# Patient Record
Sex: MALE | Race: WHITE | Employment: OTHER | URBAN - METROPOLITAN AREA
[De-identification: names, ages, dates, MRNs, and addresses within clinical notes are randomized per-mention and may not be internally consistent; named-entity substitution may affect disease eponyms.]

---

## 2017-10-13 PROBLEM — R10.32 LEFT INGUINAL PAIN: Chronic | Status: ACTIVE | Noted: 2017-10-13

## 2017-12-28 PROBLEM — R10.32 LEFT INGUINAL PAIN: Chronic | Status: RESOLVED | Noted: 2017-10-13 | Resolved: 2017-12-28

## 2018-04-20 DIAGNOSIS — J45.21 MILD INTERMITTENT ASTHMA WITH ACUTE EXACERBATION: ICD-10-CM

## 2018-05-21 ENCOUNTER — TELEPHONE (OUTPATIENT)
Dept: ADMINISTRATIVE | Age: 53
End: 2018-05-21

## 2018-05-21 NOTE — TELEPHONE ENCOUNTER
neck bothering him, pulled muscle or headache, requested morning appt tomorrow,pcp not available; pt said he will just go to walk in care this evening after work

## 2018-05-22 ENCOUNTER — OFFICE VISIT (OUTPATIENT)
Dept: FAMILY MEDICINE CLINIC | Age: 53
End: 2018-05-22
Payer: COMMERCIAL

## 2018-05-22 VITALS
DIASTOLIC BLOOD PRESSURE: 81 MMHG | BODY MASS INDEX: 29.55 KG/M2 | SYSTOLIC BLOOD PRESSURE: 128 MMHG | RESPIRATION RATE: 16 BRPM | HEIGHT: 68 IN | HEART RATE: 73 BPM | WEIGHT: 195 LBS

## 2018-05-22 DIAGNOSIS — S16.1XXA STRAIN OF NECK MUSCLE, INITIAL ENCOUNTER: Primary | ICD-10-CM

## 2018-05-22 PROCEDURE — 99213 OFFICE O/P EST LOW 20 MIN: CPT | Performed by: PHYSICIAN ASSISTANT

## 2018-07-02 DIAGNOSIS — J45.20 MILD INTERMITTENT ASTHMA WITHOUT COMPLICATION: Chronic | ICD-10-CM

## 2018-07-02 RX ORDER — ALBUTEROL SULFATE 90 UG/1
AEROSOL, METERED RESPIRATORY (INHALATION)
Qty: 3 INHALER | Refills: 1 | Status: SHIPPED | OUTPATIENT
Start: 2018-07-02 | End: 2018-11-05 | Stop reason: SDUPTHER

## 2018-11-05 ENCOUNTER — OFFICE VISIT (OUTPATIENT)
Dept: FAMILY MEDICINE CLINIC | Age: 53
End: 2018-11-05
Payer: COMMERCIAL

## 2018-11-05 ENCOUNTER — TELEPHONE (OUTPATIENT)
Dept: ADMINISTRATIVE | Age: 53
End: 2018-11-05

## 2018-11-05 VITALS
DIASTOLIC BLOOD PRESSURE: 82 MMHG | HEART RATE: 68 BPM | OXYGEN SATURATION: 99 % | WEIGHT: 195 LBS | SYSTOLIC BLOOD PRESSURE: 138 MMHG | HEIGHT: 68 IN | BODY MASS INDEX: 29.55 KG/M2 | RESPIRATION RATE: 18 BRPM

## 2018-11-05 DIAGNOSIS — J45.20 MILD INTERMITTENT ASTHMA WITHOUT COMPLICATION: Chronic | ICD-10-CM

## 2018-11-05 DIAGNOSIS — M79.641 PAIN OF RIGHT HAND: Primary | ICD-10-CM

## 2018-11-05 PROCEDURE — 99213 OFFICE O/P EST LOW 20 MIN: CPT | Performed by: FAMILY MEDICINE

## 2018-11-05 RX ORDER — ALBUTEROL SULFATE 90 UG/1
AEROSOL, METERED RESPIRATORY (INHALATION)
Qty: 3 INHALER | Refills: 1 | Status: SHIPPED | OUTPATIENT
Start: 2018-11-05 | End: 2019-02-12 | Stop reason: ALTCHOICE

## 2018-11-05 ASSESSMENT — PATIENT HEALTH QUESTIONNAIRE - PHQ9
SUM OF ALL RESPONSES TO PHQ QUESTIONS 1-9: 0
2. FEELING DOWN, DEPRESSED OR HOPELESS: 0
SUM OF ALL RESPONSES TO PHQ QUESTIONS 1-9: 0
SUM OF ALL RESPONSES TO PHQ9 QUESTIONS 1 & 2: 0
1. LITTLE INTEREST OR PLEASURE IN DOING THINGS: 0

## 2018-11-05 NOTE — PROGRESS NOTES
Right hand pain for 4 months  Does repetitive hand motion at work  Takes one SoLatina with good results  No trauma or swelling    Asthma stable  Takes advair in fall  Now having increasing cough and dyspnea. Physical examination :  /82   Pulse 68   Resp 18   Ht 5' 8\" (1.727 m)   Wt 195 lb (88.5 kg)   SpO2 99%   BMI 29.65 kg/m²     General appearance : healthy, no distress. Eyes : non-icteric sclerae. No goiter. Neck is supple, no masses. No carotid bruits  Lungs : clear bilaterally, no wheezing or crackles. Heart : regular, normal S1S2, no murmurs. Abdomen : soft, no masses. No hepatic or splenomegaly. Extremities : no edema. Right hand no deformities or tenderness. Normal range of motion of wrist and fingers. No tenderness noted  Peripheral pulses : normal.  Gait : normal.  Mood :euthymic. Affect : congruent. BMI was elevated today, and weight loss plan recommended is : conventional weight loss and daily exercise regimen.     Hand pain secondary to strain and tendinitis  Aleve prn  Tylenol prn  Asthma  Refill albuterol and advair

## 2018-12-26 DIAGNOSIS — J45.20 MILD INTERMITTENT ASTHMA WITHOUT COMPLICATION: Chronic | ICD-10-CM

## 2018-12-26 RX ORDER — ALBUTEROL SULFATE 90 MCG
HFA AEROSOL WITH ADAPTER (GRAM) INHALATION
Qty: 20.1 G | Refills: 1 | Status: SHIPPED | OUTPATIENT
Start: 2018-12-26 | End: 2020-01-22

## 2019-02-12 ENCOUNTER — OFFICE VISIT (OUTPATIENT)
Dept: FAMILY MEDICINE CLINIC | Age: 54
End: 2019-02-12
Payer: COMMERCIAL

## 2019-02-12 VITALS
BODY MASS INDEX: 29.86 KG/M2 | HEART RATE: 82 BPM | RESPIRATION RATE: 18 BRPM | WEIGHT: 197 LBS | HEIGHT: 68 IN | SYSTOLIC BLOOD PRESSURE: 140 MMHG | DIASTOLIC BLOOD PRESSURE: 84 MMHG

## 2019-02-12 DIAGNOSIS — F41.1 GENERALIZED ANXIETY DISORDER: ICD-10-CM

## 2019-02-12 DIAGNOSIS — Z13.220 SCREENING FOR HYPERLIPIDEMIA: ICD-10-CM

## 2019-02-12 DIAGNOSIS — R00.2 PALPITATIONS: Primary | ICD-10-CM

## 2019-02-12 DIAGNOSIS — R03.0 ELEVATED BLOOD PRESSURE READING: ICD-10-CM

## 2019-02-12 PROCEDURE — 99213 OFFICE O/P EST LOW 20 MIN: CPT | Performed by: FAMILY MEDICINE

## 2019-02-12 RX ORDER — OLOPATADINE HYDROCHLORIDE 1 MG/ML
SOLUTION/ DROPS OPHTHALMIC
COMMUNITY
Start: 2019-01-31

## 2019-02-12 RX ORDER — FLUOROMETHOLONE 0.1 %
SUSPENSION, DROPS(FINAL DOSAGE FORM)(ML) OPHTHALMIC (EYE)
COMMUNITY
Start: 2019-01-31

## 2019-02-12 RX ORDER — METOPROLOL SUCCINATE 25 MG/1
25 TABLET, EXTENDED RELEASE ORAL DAILY
Qty: 30 TABLET | Refills: 0 | Status: SHIPPED | OUTPATIENT
Start: 2019-02-12 | End: 2019-03-29 | Stop reason: ALTCHOICE

## 2019-02-12 ASSESSMENT — PATIENT HEALTH QUESTIONNAIRE - PHQ9
SUM OF ALL RESPONSES TO PHQ QUESTIONS 1-9: 2
1. LITTLE INTEREST OR PLEASURE IN DOING THINGS: 1
SUM OF ALL RESPONSES TO PHQ9 QUESTIONS 1 & 2: 2
2. FEELING DOWN, DEPRESSED OR HOPELESS: 1
SUM OF ALL RESPONSES TO PHQ QUESTIONS 1-9: 2

## 2019-03-25 ENCOUNTER — HOSPITAL ENCOUNTER (OUTPATIENT)
Age: 54
Discharge: HOME | End: 2019-03-25
Payer: COMMERCIAL

## 2019-03-25 DIAGNOSIS — R03.0 ELEVATED BLOOD PRESSURE READING: ICD-10-CM

## 2019-03-25 DIAGNOSIS — Z13.220 SCREENING FOR HYPERLIPIDEMIA: ICD-10-CM

## 2019-03-25 LAB
ALBUMIN SERPL-MCNC: 4.4 G/DL (ref 3.5–5.2)
ALP BLD-CCNC: 55 U/L (ref 40–129)
ALT SERPL-CCNC: 17 U/L (ref 0–40)
ANION GAP SERPL CALCULATED.3IONS-SCNC: 10 MMOL/L (ref 7–16)
AST SERPL-CCNC: 22 U/L (ref 0–39)
BILIRUB SERPL-MCNC: 0.8 MG/DL (ref 0–1.2)
BUN BLDV-MCNC: 15 MG/DL (ref 6–20)
CALCIUM SERPL-MCNC: 9.3 MG/DL (ref 8.6–10.2)
CHLORIDE BLD-SCNC: 105 MMOL/L (ref 98–107)
CHOLESTEROL, TOTAL: 179 MG/DL (ref 0–199)
CO2: 27 MMOL/L (ref 22–29)
CREAT SERPL-MCNC: 1 MG/DL (ref 0.7–1.2)
GFR AFRICAN AMERICAN: >60
GFR NON-AFRICAN AMERICAN: >60 ML/MIN/1.73
GLUCOSE BLD-MCNC: 103 MG/DL (ref 74–99)
HDLC SERPL-MCNC: 65 MG/DL
LDL CHOLESTEROL CALCULATED: 101 MG/DL (ref 0–99)
POTASSIUM SERPL-SCNC: 4.1 MMOL/L (ref 3.5–5)
SODIUM BLD-SCNC: 142 MMOL/L (ref 132–146)
TOTAL PROTEIN: 7.6 G/DL (ref 6.4–8.3)
TRIGL SERPL-MCNC: 67 MG/DL (ref 0–149)
VLDLC SERPL CALC-MCNC: 13 MG/DL

## 2019-03-25 PROCEDURE — 80053 COMPREHEN METABOLIC PANEL: CPT

## 2019-03-25 PROCEDURE — 36415 COLL VENOUS BLD VENIPUNCTURE: CPT

## 2019-03-25 PROCEDURE — 80061 LIPID PANEL: CPT

## 2019-03-29 ENCOUNTER — OFFICE VISIT (OUTPATIENT)
Dept: FAMILY MEDICINE CLINIC | Age: 54
End: 2019-03-29
Payer: COMMERCIAL

## 2019-03-29 VITALS
HEART RATE: 91 BPM | BODY MASS INDEX: 30.31 KG/M2 | RESPIRATION RATE: 16 BRPM | SYSTOLIC BLOOD PRESSURE: 135 MMHG | WEIGHT: 200 LBS | HEIGHT: 68 IN | DIASTOLIC BLOOD PRESSURE: 79 MMHG

## 2019-03-29 DIAGNOSIS — F41.9 ANXIETY: Primary | ICD-10-CM

## 2019-03-29 PROCEDURE — 99213 OFFICE O/P EST LOW 20 MIN: CPT | Performed by: FAMILY MEDICINE

## 2019-03-29 ASSESSMENT — PATIENT HEALTH QUESTIONNAIRE - PHQ9
1. LITTLE INTEREST OR PLEASURE IN DOING THINGS: 0
2. FEELING DOWN, DEPRESSED OR HOPELESS: 0
SUM OF ALL RESPONSES TO PHQ QUESTIONS 1-9: 0
SUM OF ALL RESPONSES TO PHQ QUESTIONS 1-9: 0
SUM OF ALL RESPONSES TO PHQ9 QUESTIONS 1 & 2: 0

## 2019-12-30 ENCOUNTER — OFFICE VISIT (OUTPATIENT)
Dept: FAMILY MEDICINE CLINIC | Age: 54
End: 2019-12-30
Payer: COMMERCIAL

## 2019-12-30 ENCOUNTER — TELEPHONE (OUTPATIENT)
Dept: FAMILY MEDICINE CLINIC | Age: 54
End: 2019-12-30

## 2019-12-30 VITALS
WEIGHT: 205 LBS | RESPIRATION RATE: 18 BRPM | OXYGEN SATURATION: 97 % | BODY MASS INDEX: 31.17 KG/M2 | SYSTOLIC BLOOD PRESSURE: 120 MMHG | TEMPERATURE: 97.8 F | HEART RATE: 93 BPM | DIASTOLIC BLOOD PRESSURE: 74 MMHG

## 2019-12-30 DIAGNOSIS — I10 ESSENTIAL HYPERTENSION: Primary | ICD-10-CM

## 2019-12-30 DIAGNOSIS — R00.2 PALPITATIONS: ICD-10-CM

## 2019-12-30 PROCEDURE — 99213 OFFICE O/P EST LOW 20 MIN: CPT | Performed by: PHYSICIAN ASSISTANT

## 2019-12-30 RX ORDER — METOPROLOL SUCCINATE 25 MG/1
25 TABLET, EXTENDED RELEASE ORAL DAILY
Qty: 30 TABLET | Refills: 0 | Status: SHIPPED | OUTPATIENT
Start: 2019-12-30 | End: 2020-01-03 | Stop reason: SDUPTHER

## 2019-12-30 ASSESSMENT — ENCOUNTER SYMPTOMS
RESPIRATORY NEGATIVE: 1
EYES NEGATIVE: 1
GASTROINTESTINAL NEGATIVE: 1

## 2020-01-03 ENCOUNTER — OFFICE VISIT (OUTPATIENT)
Dept: FAMILY MEDICINE CLINIC | Age: 55
End: 2020-01-03
Payer: COMMERCIAL

## 2020-01-03 VITALS
HEIGHT: 68 IN | SYSTOLIC BLOOD PRESSURE: 134 MMHG | BODY MASS INDEX: 31.22 KG/M2 | WEIGHT: 206 LBS | RESPIRATION RATE: 16 BRPM | HEART RATE: 70 BPM | DIASTOLIC BLOOD PRESSURE: 78 MMHG

## 2020-01-03 PROCEDURE — 99213 OFFICE O/P EST LOW 20 MIN: CPT | Performed by: FAMILY MEDICINE

## 2020-01-03 RX ORDER — METOPROLOL SUCCINATE 25 MG/1
25 TABLET, EXTENDED RELEASE ORAL DAILY
Qty: 90 TABLET | Refills: 1 | Status: SHIPPED
Start: 2020-01-03 | End: 2020-03-14 | Stop reason: SDUPTHER

## 2020-01-03 ASSESSMENT — PATIENT HEALTH QUESTIONNAIRE - PHQ9
2. FEELING DOWN, DEPRESSED OR HOPELESS: 0
1. LITTLE INTEREST OR PLEASURE IN DOING THINGS: 0
SUM OF ALL RESPONSES TO PHQ QUESTIONS 1-9: 0
SUM OF ALL RESPONSES TO PHQ QUESTIONS 1-9: 0
SUM OF ALL RESPONSES TO PHQ9 QUESTIONS 1 & 2: 0

## 2020-01-03 NOTE — PROGRESS NOTES
Under increased stress at work  Not checking BP  Started on metoprolol early this week  Feels better on it. No exertional chest pain or dyspnea. No ankle edema. No orthopnea. No depression      Physical examination :  /78   Pulse 70   Resp 16   Ht 5' 8\" (1.727 m)   Wt 206 lb (93.4 kg)   BMI 31.32 kg/m²     General appearance : healthy, no distress. Eyes : non-icteric sclerae. No goiter. Neck is supple, no masses. No carotid bruits  Lungs : clear bilaterally, no wheezing or crackles. Heart : regular, normal S1S2, no murmurs. Abdomen : soft, no masses. No hepatic or splenomegaly. Extremities : no edema. Peripheral pulses : normal.  Gait : normal.  Mood :euthymic. Affect : congruent. BMI was elevated today, and weight loss plan recommended is : conventional weight loss and daily exercise regimen. A/P  Anxiety   Palpitations   Improved on metoprolol. Continue same  Advised diet and weight loss. Advised regular exercise. Monitor blood pressure periodically.

## 2020-02-24 RX ORDER — FLUTICASONE PROPIONATE 50 MCG
SPRAY, SUSPENSION (ML) NASAL
Qty: 3 BOTTLE | Refills: 0 | Status: SHIPPED
Start: 2020-02-24 | End: 2020-03-13 | Stop reason: SDUPTHER

## 2020-03-14 RX ORDER — FLUTICASONE PROPIONATE 50 MCG
SPRAY, SUSPENSION (ML) NASAL
Qty: 3 BOTTLE | Refills: 2 | Status: SHIPPED
Start: 2020-03-14 | End: 2020-07-06 | Stop reason: SDUPTHER

## 2020-03-14 RX ORDER — METOPROLOL SUCCINATE 25 MG/1
25 TABLET, EXTENDED RELEASE ORAL DAILY
Qty: 90 TABLET | Refills: 1 | Status: SHIPPED
Start: 2020-03-14 | End: 2020-07-06 | Stop reason: SDUPTHER

## 2020-03-14 RX ORDER — ALBUTEROL SULFATE 90 UG/1
AEROSOL, METERED RESPIRATORY (INHALATION)
Qty: 20.1 G | Refills: 2 | Status: SHIPPED
Start: 2020-03-14 | End: 2020-03-16 | Stop reason: SDUPTHER

## 2020-03-16 NOTE — TELEPHONE ENCOUNTER
Advair no longer covered under patient's insurance. They will cover [facility] or Symbicort. Can we switch to one of these? If so please send to [facility]. 90 day supply. Also his Albuterol inhaler was only sent for 2 inhalers. He would like 3 if possible. Pended for sig.

## 2020-03-17 RX ORDER — ALBUTEROL SULFATE 90 UG/1
AEROSOL, METERED RESPIRATORY (INHALATION)
Qty: 3 INHALER | Refills: 1 | Status: SHIPPED | OUTPATIENT
Start: 2020-03-17 | End: 2020-03-20

## 2020-03-17 RX ORDER — BUDESONIDE AND FORMOTEROL FUMARATE DIHYDRATE 80; 4.5 UG/1; UG/1
2 AEROSOL RESPIRATORY (INHALATION) 2 TIMES DAILY
Qty: 6 INHALER | Refills: 2 | Status: SHIPPED
Start: 2020-03-17 | End: 2020-07-06

## 2020-03-19 NOTE — TELEPHONE ENCOUNTER
The albuterol inhaler not covered per insurance. They will however cover the brand Proair. Pended the Proair for sig if agreeable.

## 2020-04-09 ENCOUNTER — TELEPHONE (OUTPATIENT)
Dept: FAMILY MEDICINE CLINIC | Age: 55
End: 2020-04-09

## 2020-04-09 RX ORDER — PREDNISONE 20 MG/1
40 TABLET ORAL DAILY
Qty: 14 TABLET | Refills: 0 | Status: SHIPPED | OUTPATIENT
Start: 2020-04-09 | End: 2020-04-16

## 2020-04-09 NOTE — TELEPHONE ENCOUNTER
Patient called c/o poison ivy again. He said he got a script last time. Are you able to send something or do you want a Video Visit set up? He did not want to come in the office.

## 2020-04-23 RX ORDER — ALBUTEROL SULFATE 90 UG/1
AEROSOL, METERED RESPIRATORY (INHALATION)
Qty: 3 INHALER | Refills: 1 | Status: SHIPPED
Start: 2020-04-23 | End: 2020-07-06 | Stop reason: SDUPTHER

## 2020-06-23 ENCOUNTER — TELEPHONE (OUTPATIENT)
Dept: ADMINISTRATIVE | Age: 55
End: 2020-06-23

## 2020-06-23 NOTE — TELEPHONE ENCOUNTER
Iliana Finders on Sunday, scratched arm, all red - thinks his tetanus shot is up to date; will go to [facility]

## 2020-07-06 ENCOUNTER — OFFICE VISIT (OUTPATIENT)
Dept: FAMILY MEDICINE CLINIC | Age: 55
End: 2020-07-06
Payer: COMMERCIAL

## 2020-07-06 ENCOUNTER — HOSPITAL ENCOUNTER (OUTPATIENT)
Age: 55
Discharge: HOME | End: 2020-07-08
Payer: COMMERCIAL

## 2020-07-06 VITALS
HEIGHT: 68 IN | BODY MASS INDEX: 31.83 KG/M2 | OXYGEN SATURATION: 98 % | WEIGHT: 210 LBS | RESPIRATION RATE: 18 BRPM | SYSTOLIC BLOOD PRESSURE: 125 MMHG | HEART RATE: 74 BPM | TEMPERATURE: 97.3 F | DIASTOLIC BLOOD PRESSURE: 73 MMHG

## 2020-07-06 PROCEDURE — 99214 OFFICE O/P EST MOD 30 MIN: CPT | Performed by: FAMILY MEDICINE

## 2020-07-06 PROCEDURE — 80061 LIPID PANEL: CPT

## 2020-07-06 PROCEDURE — 83036 HEMOGLOBIN GLYCOSYLATED A1C: CPT

## 2020-07-06 RX ORDER — AZELASTINE 1 MG/ML
2 SPRAY, METERED NASAL 2 TIMES DAILY
Qty: 1 BOTTLE | Refills: 3 | Status: SHIPPED
Start: 2020-07-06 | End: 2021-01-04 | Stop reason: SDUPTHER

## 2020-07-06 RX ORDER — ALBUTEROL SULFATE 90 UG/1
AEROSOL, METERED RESPIRATORY (INHALATION)
Qty: 3 INHALER | Refills: 1 | Status: SHIPPED
Start: 2020-07-06 | End: 2020-07-06 | Stop reason: SDUPTHER

## 2020-07-06 RX ORDER — FLUTICASONE PROPIONATE 50 MCG
SPRAY, SUSPENSION (ML) NASAL
Qty: 3 BOTTLE | Refills: 2 | Status: SHIPPED
Start: 2020-07-06 | End: 2020-07-06 | Stop reason: SDUPTHER

## 2020-07-06 RX ORDER — BUDESONIDE AND FORMOTEROL FUMARATE DIHYDRATE 160; 4.5 UG/1; UG/1
2 AEROSOL RESPIRATORY (INHALATION) 2 TIMES DAILY
Qty: 3 INHALER | Refills: 3 | Status: SHIPPED
Start: 2020-07-06 | End: 2021-01-04 | Stop reason: SDUPTHER

## 2020-07-06 RX ORDER — BUDESONIDE AND FORMOTEROL FUMARATE DIHYDRATE 160; 4.5 UG/1; UG/1
2 AEROSOL RESPIRATORY (INHALATION) 2 TIMES DAILY
Qty: 3 INHALER | Refills: 3 | Status: SHIPPED
Start: 2020-07-06 | End: 2020-07-06 | Stop reason: SDUPTHER

## 2020-07-06 RX ORDER — METOPROLOL SUCCINATE 25 MG/1
25 TABLET, EXTENDED RELEASE ORAL DAILY
Qty: 90 TABLET | Refills: 3 | Status: SHIPPED
Start: 2020-07-06 | End: 2020-07-06 | Stop reason: SDUPTHER

## 2020-07-06 RX ORDER — METOPROLOL SUCCINATE 25 MG/1
25 TABLET, EXTENDED RELEASE ORAL DAILY
Qty: 90 TABLET | Refills: 3 | Status: SHIPPED
Start: 2020-07-06 | End: 2021-01-04

## 2020-07-06 RX ORDER — FLUTICASONE PROPIONATE 50 MCG
SPRAY, SUSPENSION (ML) NASAL
Qty: 3 BOTTLE | Refills: 2 | Status: SHIPPED
Start: 2020-07-06 | End: 2021-01-04 | Stop reason: SDUPTHER

## 2020-07-06 RX ORDER — ALBUTEROL SULFATE 90 UG/1
AEROSOL, METERED RESPIRATORY (INHALATION)
Qty: 3 INHALER | Refills: 1 | Status: SHIPPED
Start: 2020-07-06 | End: 2021-01-04 | Stop reason: SDUPTHER

## 2020-07-06 SDOH — HEALTH STABILITY: MENTAL HEALTH: HOW OFTEN DO YOU HAVE A DRINK CONTAINING ALCOHOL?: 2-4 TIMES A MONTH

## 2020-07-06 ASSESSMENT — ENCOUNTER SYMPTOMS
CHEST TIGHTNESS: 1
ABDOMINAL PAIN: 0
WHEEZING: 1
SHORTNESS OF BREATH: 0

## 2020-07-06 NOTE — PATIENT INSTRUCTIONS
Nye reading about how to prevent transmission. Subject to change as we learn more. Keeping the Coronavirus from [employer] Workers  What [place]s and [employer] success is teaching us about the pandemic. By Rufino Huerta   March 21, 2020  "[employer]. com/news/news-desk/keeping-the-coronavirus-from-infecting-health-care-workers         Azelastine = astelin - use this for control of nasal allergies - works TODAY!    Fill this at [facility]

## 2020-07-06 NOTE — PROGRESS NOTES
[facility]  Family Medicine Residency Program  Phone: [phone]  Fax: [phone]    Patient:  Danelle Collins 49 y.o. male                                 Date of Service: 7/6/20                            Chiefcomplaint:   Chief Complaint   Patient presents with   Yared Stanton Doctor     harshad jang pt     Shortness of Breath         History of Present Illness: The patient is a 49 y.o. male  presented to the clinic with complaints as above. Asthma - has been controller inhaler regularly. Has noted increased wheezing over the past year and it has been less responsive to albuterol. Still clears wheezing with albuterol but not as quickly as in the past.  No increased allergic exposures - potentially brush hog exposure to airborne allergens. Not been hospitalized for this. Last oral pred was for poison ivy but not for breathing. Allergic Rhinitis - bothersome including eye sx and nasal drainage. Has been intermittently using Flonase. Does help to clear when he uses it. Med is somewhat bothersome. Willing to try azelastine PRN. Anxiety - occurred when GM was going through transitions. Started on metoprolol for palpitations. Interested in reducing this when able. Overall sx stable for now. Interested in trying 12.5mg daily to see if this is still effective. Review of Systems:   Review of Systems   Respiratory: Positive for chest tightness and wheezing. Negative for shortness of breath. Cardiovascular: Negative for chest pain and palpitations. Gastrointestinal: Negative for abdominal pain.        Past Medical History:      Diagnosis Date    Anxiety     Asthma     intermittent    Left inguinal pain 10/13/2017    Lymphadenopathy, abdominal 6/10/2015    Incidental on CT 6/2015    Rotator cuff syndrome of left shoulder 9/8/2013    Seasonal allergies        Past Surgical History:        Procedure Laterality Date    COLONOSCOPY  07/01/2017    normal distress  Chest wall/Lung: Clear to auscultation bilaterally,  respirations unlabored. No ronchi/wheezing/rales  Heart[de-identified]  Regular rate and rhythm, S1and S2 normal, no murmur, rub or gallop. Abdomen: Soft, mild epigastrictenderness, bowel sounds normoactive, no masses, no organomegaly  Extremities:  Extremities normal, atraumatic, no cyanosis. +0 edema. Neurologic:   Alert&Oriented. Normal gait and coordination  No focal neurological deficits appreaciated         Psychiatric: has a normal mood and affect. Behavior is normal.       Assessment and Plan:         1. Other allergic rhinitis  Will add azelastine and will allow flonase seasonally  - fluticasone (FLONASE) 50 MCG/ACT nasal spray; USE 2 SPRAYS NASALLY DAILY  Dispense: 3 Bottle; Refill: 2    2. Palpitations  Ok to 1/2 this med  - metoprolol succinate (TOPROL XL) 25 MG extended release tablet; Take 1 tablet by mouth daily  Dispense: 90 tablet; Refill: 3    3. Mild intermittent asthma without complication  Increase symbicort  - budesonide-formoterol (SYMBICORT) 160-4.5 MCG/ACT AERO; Inhale 2 puffs into the lungs 2 times daily  Dispense: 3 Inhaler; Refill: 3  - albuterol sulfate HFA (PROAIR HFA) 108 (90 Base) MCG/ACT inhaler; Generic pro air. 1-2 puffs every 6 hours as needed  Dispense: 3 Inhaler; Refill: 1    4. Screening for hyperlipidemia  - LIPID PANEL; Future    5. Impaired fasting glucose  - HEMOGLOBIN A1C; Future        Return to Office: Return in about 6 months (around 1/6/2021) for asthma. I encourage further reading and education about your health conditions. Information on many healthconditions is provided by the American Academy of Family Physicians: https://familydoctor. org/  Please bring any questions to me at your next visit. This document may have been prepared at least partiallythrough the use of voice recognition software.  Although effort is taken to assure the accuracy of this document, it is possible that grammatical, syntax,  or spelling errors may occur. Medication List:    Current Outpatient Medications   Medication Sig Dispense Refill    azelastine (ASTELIN) 0.1 % nasal spray 2 sprays by Nasal route 2 times daily Use in each nostril as directed 1 Bottle 3    albuterol sulfate HFA (PROAIR HFA) 108 (90 Base) MCG/ACT inhaler Generic pro air. 1-2 puffs every 6 hours as needed 3 Inhaler 1    budesonide-formoterol (SYMBICORT) 160-4.5 MCG/ACT AERO Inhale 2 puffs into the lungs 2 times daily 3 Inhaler 3    metoprolol succinate (TOPROL XL) 25 MG extended release tablet Take 1 tablet by mouth daily 90 tablet 3    fluticasone (FLONASE) 50 MCG/ACT nasal spray USE 2 SPRAYS NASALLY DAILY 3 Bottle 2    fluorometholone (FML) 0.1 % ophthalmic suspension       olopatadine (PATANOL) 0.1 % ophthalmic solution        No current facility-administered medications for this visit.          Devon Solano MD

## 2020-07-07 LAB
CHOLESTEROL, TOTAL: 165 MG/DL (ref 0–199)
HBA1C MFR BLD: 5.6 % (ref 4–5.6)
HDLC SERPL-MCNC: 55 MG/DL
LDL CHOLESTEROL CALCULATED: 90 MG/DL (ref 0–99)
TRIGL SERPL-MCNC: 101 MG/DL (ref 0–149)
VLDLC SERPL CALC-MCNC: 20 MG/DL

## 2020-09-28 ENCOUNTER — TELEPHONE (OUTPATIENT)
Dept: FAMILY MEDICINE CLINIC | Age: 55
End: 2020-09-28

## 2020-09-28 RX ORDER — PREDNISONE 20 MG/1
20 TABLET ORAL DAILY
Qty: 10 TABLET | Refills: 0 | Status: SHIPPED | OUTPATIENT
Start: 2020-09-28 | End: 2020-10-08

## 2020-09-28 NOTE — TELEPHONE ENCOUNTER
Last Appointment   7/6/2020  Next Appointment  1/4/2021    Patient has poison ivy, states that [address] [place] [address] would just call in Prednisone for him. States he gets this every year  I offered an appointment today, he is unable to come in. Wants to know if  can call in medication.

## 2021-01-04 ENCOUNTER — OFFICE VISIT (OUTPATIENT)
Dept: FAMILY MEDICINE CLINIC | Age: 56
End: 2021-01-04
Payer: COMMERCIAL

## 2021-01-04 VITALS
DIASTOLIC BLOOD PRESSURE: 77 MMHG | OXYGEN SATURATION: 98 % | HEART RATE: 76 BPM | HEIGHT: 68 IN | WEIGHT: 217 LBS | RESPIRATION RATE: 18 BRPM | TEMPERATURE: 97.8 F | SYSTOLIC BLOOD PRESSURE: 146 MMHG | BODY MASS INDEX: 32.89 KG/M2

## 2021-01-04 DIAGNOSIS — R73.01 IMPAIRED FASTING GLUCOSE: Primary | ICD-10-CM

## 2021-01-04 DIAGNOSIS — Z12.5 SCREENING FOR PROSTATE CANCER: ICD-10-CM

## 2021-01-04 DIAGNOSIS — R00.2 PALPITATIONS: ICD-10-CM

## 2021-01-04 DIAGNOSIS — J30.89 OTHER ALLERGIC RHINITIS: ICD-10-CM

## 2021-01-04 DIAGNOSIS — Z13.220 SCREENING FOR HYPERLIPIDEMIA: ICD-10-CM

## 2021-01-04 DIAGNOSIS — J45.20 MILD INTERMITTENT ASTHMA WITHOUT COMPLICATION: ICD-10-CM

## 2021-01-04 PROCEDURE — 99214 OFFICE O/P EST MOD 30 MIN: CPT | Performed by: FAMILY MEDICINE

## 2021-01-04 RX ORDER — ALBUTEROL SULFATE 90 UG/1
AEROSOL, METERED RESPIRATORY (INHALATION)
Qty: 3 INHALER | Refills: 1 | Status: SHIPPED
Start: 2021-01-04 | End: 2021-04-27

## 2021-01-04 RX ORDER — PREDNISONE 20 MG/1
20 TABLET ORAL DAILY
Qty: 10 TABLET | Refills: 0 | Status: SHIPPED
Start: 2021-01-04 | End: 2022-01-07 | Stop reason: SDUPTHER

## 2021-01-04 RX ORDER — AZELASTINE 1 MG/ML
2 SPRAY, METERED NASAL 2 TIMES DAILY
Qty: 1 BOTTLE | Refills: 3 | Status: SHIPPED
Start: 2021-01-04 | End: 2021-06-21 | Stop reason: SDUPTHER

## 2021-01-04 RX ORDER — BUDESONIDE AND FORMOTEROL FUMARATE DIHYDRATE 160; 4.5 UG/1; UG/1
2 AEROSOL RESPIRATORY (INHALATION) 2 TIMES DAILY
Qty: 3 INHALER | Refills: 3 | Status: SHIPPED
Start: 2021-01-04 | End: 2021-06-21 | Stop reason: SDUPTHER

## 2021-01-04 RX ORDER — FLUTICASONE PROPIONATE 50 MCG
SPRAY, SUSPENSION (ML) NASAL
Qty: 3 BOTTLE | Refills: 2 | Status: SHIPPED
Start: 2021-01-04 | End: 2021-06-21 | Stop reason: SDUPTHER

## 2021-01-04 RX ORDER — TRIAMCINOLONE ACETONIDE 5 MG/G
CREAM TOPICAL
Qty: 1 TUBE | Refills: 1 | Status: SHIPPED | OUTPATIENT
Start: 2021-01-04 | End: 2021-01-11

## 2021-01-04 ASSESSMENT — ENCOUNTER SYMPTOMS
SHORTNESS OF BREATH: 0
ABDOMINAL PAIN: 0
CHEST TIGHTNESS: 0

## 2021-01-04 ASSESSMENT — PATIENT HEALTH QUESTIONNAIRE - PHQ9
2. FEELING DOWN, DEPRESSED OR HOPELESS: 0
SUM OF ALL RESPONSES TO PHQ QUESTIONS 1-9: 0

## 2021-01-04 NOTE — PROGRESS NOTES
[facility] (formerly [facility]3]  Family Medicine Residency Program  Phone: [phone]  Fax: [phone]    Patient:  David Garcia 48 y.o. male                                 Date of Service: 1/4/21                            Chiefcomplaint:   Chief Complaint   Patient presents with    Asthma     6 mo f/u         History of Present Illness: The patient is a 48 y.o. male  presented to the clinic with complaints as above. Asthma - increased symbicort has helped. He is no longer wheezing. Using albuterol up to daily due to feeling winded, however we discussed deconditioning. He reports that in the past albuterol used to open him up but he isn't getting that relief any more and therefore is concerned about deconditioning overlapping. Overall the increase in Symbicort helped a lot. Nasal allergies - uses seasonally usually in the spring. No issues presently. Palpitations - has been off metoprolol and has been tolerating well without meds. Has been keeping a little of this on hand incase this kicks up and he needs this for a day or two. Poison IVY - occurs mostly in the summer when working outside. Has used pred in in the past without issues. Lesion R forehead - scratched it off a few times and it keeps returning. Review of Systems:   Review of Systems   Respiratory: Negative for chest tightness and shortness of breath. Cardiovascular: Negative for chest pain and palpitations. Gastrointestinal: Negative for abdominal pain.        Past Medical History:      Diagnosis Date    Anxiety     Asthma     intermittent    Left inguinal pain 10/13/2017    Lymphadenopathy, abdominal 6/10/2015    Incidental on CT 6/2015    Rotator cuff syndrome of left shoulder 9/8/2013    Seasonal allergies        Past Surgical History:        Procedure Laterality Date    COLONOSCOPY  07/01/2017    normal Dr Mushtaq Marshall Left     meniscus       Allergies:    Patient has no known unlabored. No ronchi/wheezing/rales  Heart[de-identified]  Regular rate and rhythm, S1and S2 normal, no murmur, rub or gallop. Extremities:  Extremities normal, atraumatic, no cyanosis. +0 edema. Skin: Right medial forehead - excoriated lesion ~2-3mm. Assessment and Plan:         1. Mild intermittent asthma without complication  We are going to cont the increased symbicort as it has helped. Still with PRN albuterol, likely will use less. If calling back with concerns that asthma is affecting exercise ability will consider 1m trial of either singulair or spiriva  - albuterol sulfate HFA (PROAIR HFA) 108 (90 Base) MCG/ACT inhaler; 1-2 puffs every 6 hours as needed. Substitute as needed. Dispense: 3 Inhaler; Refill: 1  - budesonide-formoterol (SYMBICORT) 160-4.5 MCG/ACT AERO; Inhale 2 puffs into the lungs 2 times daily  Dispense: 3 Inhaler; Refill: 3    2. Other allergic rhinitis  controlled  - fluticasone (FLONASE) 50 MCG/ACT nasal spray; USE 2 SPRAYS NASALLY DAILY  Dispense: 3 Bottle; Refill: 2  - azelastine (ASTELIN) 0.1 % nasal spray; 2 sprays by Nasal route 2 times daily Use in each nostril as directed  Dispense: 1 Bottle; Refill: 3    3. Impaired fasting glucose  - Glucose, Fasting; Future  - HEMOGLOBIN A1C; Future    4. Screening for hyperlipidemia  - Lipid, Fasting; Future    5. Palpitations  Controlled off meds    6. Screening for prostate cancer  - PSA SCREENING; Future    Will offer lilia grady tx - discussed appropriate use of meds. Will see DERM for forehead lesion - discussed possible BCC. Return to Office: Return in about 6 months (around 7/4/2021), or asthma, discuss prostate. I encourage further reading and education about your health conditions. Information on many healthconditions is provided by the American Academy of Family Physicians: https://familydoctor. org/  Please bring any questions to me at your next visit.     This document may have been prepared at least partiallythrough the use of voice recognition software. Although effort is taken to assure the accuracy of this document, it is possible that grammatical, syntax,  or spelling errors may occur. Medication List:    Current Outpatient Medications   Medication Sig Dispense Refill    albuterol sulfate HFA (PROAIR HFA) 108 (90 Base) MCG/ACT inhaler 1-2 puffs every 6 hours as needed. Substitute as needed. 3 Inhaler 1    triamcinolone (ARISTOCORT) 0.5 % cream Apply topically 3 times daily for up to a week. Disp large tube. 1 Tube 1    predniSONE (DELTASONE) 20 MG tablet Take 1 tablet by mouth daily for 10 days 10 tablet 0    budesonide-formoterol (SYMBICORT) 160-4.5 MCG/ACT AERO Inhale 2 puffs into the lungs 2 times daily 3 Inhaler 3    fluticasone (FLONASE) 50 MCG/ACT nasal spray USE 2 SPRAYS NASALLY DAILY 3 Bottle 2    azelastine (ASTELIN) 0.1 % nasal spray 2 sprays by Nasal route 2 times daily Use in each nostril as directed 1 Bottle 3    fluorometholone (FML) 0.1 % ophthalmic suspension       olopatadine (PATANOL) 0.1 % ophthalmic solution        No current facility-administered medications for this visit.          Gibran Waite MD

## 2021-04-27 DIAGNOSIS — J45.20 MILD INTERMITTENT ASTHMA WITHOUT COMPLICATION: ICD-10-CM

## 2021-04-27 RX ORDER — ALBUTEROL SULFATE 90 UG/1
AEROSOL, METERED RESPIRATORY (INHALATION)
Qty: 3 INHALER | Refills: 0 | Status: SHIPPED
Start: 2021-04-27 | End: 2021-06-21 | Stop reason: SDUPTHER

## 2021-06-16 ENCOUNTER — NURSE ONLY (OUTPATIENT)
Dept: FAMILY MEDICINE CLINIC | Age: 56
End: 2021-06-16
Payer: COMMERCIAL

## 2021-06-16 DIAGNOSIS — R73.01 IMPAIRED FASTING GLUCOSE: ICD-10-CM

## 2021-06-16 DIAGNOSIS — Z12.5 SCREENING FOR PROSTATE CANCER: ICD-10-CM

## 2021-06-16 DIAGNOSIS — Z13.220 SCREENING FOR HYPERLIPIDEMIA: ICD-10-CM

## 2021-06-16 LAB
CHOLESTEROL, FASTING: 170 MG/DL (ref 0–199)
GLUCOSE FASTING: 88 MG/DL (ref 74–99)
HBA1C MFR BLD: 5.4 % (ref 4–5.6)
HDLC SERPL-MCNC: 59 MG/DL
LDL CHOLESTEROL CALCULATED: 102 MG/DL (ref 0–99)
PROSTATE SPECIFIC ANTIGEN: 0.8 NG/ML (ref 0–4)
TRIGLYCERIDE, FASTING: 45 MG/DL (ref 0–149)
VLDLC SERPL CALC-MCNC: 9 MG/DL

## 2021-06-16 PROCEDURE — 36415 COLL VENOUS BLD VENIPUNCTURE: CPT | Performed by: FAMILY MEDICINE

## 2021-06-21 ENCOUNTER — OFFICE VISIT (OUTPATIENT)
Dept: FAMILY MEDICINE CLINIC | Age: 56
End: 2021-06-21
Payer: COMMERCIAL

## 2021-06-21 VITALS
RESPIRATION RATE: 18 BRPM | SYSTOLIC BLOOD PRESSURE: 122 MMHG | HEIGHT: 68 IN | BODY MASS INDEX: 31.89 KG/M2 | HEART RATE: 70 BPM | TEMPERATURE: 98.9 F | DIASTOLIC BLOOD PRESSURE: 78 MMHG | OXYGEN SATURATION: 98 % | WEIGHT: 210.4 LBS

## 2021-06-21 DIAGNOSIS — J30.89 OTHER ALLERGIC RHINITIS: ICD-10-CM

## 2021-06-21 DIAGNOSIS — J45.20 MILD INTERMITTENT ASTHMA WITHOUT COMPLICATION: ICD-10-CM

## 2021-06-21 PROCEDURE — 99213 OFFICE O/P EST LOW 20 MIN: CPT | Performed by: FAMILY MEDICINE

## 2021-06-21 RX ORDER — ALBUTEROL SULFATE 90 UG/1
AEROSOL, METERED RESPIRATORY (INHALATION)
Qty: 3 INHALER | Refills: 0 | Status: SHIPPED
Start: 2021-06-21 | End: 2021-10-01

## 2021-06-21 RX ORDER — FLUTICASONE PROPIONATE 50 MCG
SPRAY, SUSPENSION (ML) NASAL
Qty: 3 BOTTLE | Refills: 2 | Status: SHIPPED
Start: 2021-06-21 | End: 2022-07-08 | Stop reason: SDUPTHER

## 2021-06-21 RX ORDER — AZELASTINE 1 MG/ML
2 SPRAY, METERED NASAL 2 TIMES DAILY
Qty: 1 BOTTLE | Refills: 3 | Status: SHIPPED
Start: 2021-06-21 | End: 2022-07-08 | Stop reason: SDUPTHER

## 2021-06-21 RX ORDER — BUDESONIDE AND FORMOTEROL FUMARATE DIHYDRATE 160; 4.5 UG/1; UG/1
2 AEROSOL RESPIRATORY (INHALATION) 2 TIMES DAILY
Qty: 3 INHALER | Refills: 3 | Status: SHIPPED
Start: 2021-06-21 | End: 2022-01-07 | Stop reason: SDUPTHER

## 2021-06-21 ASSESSMENT — ENCOUNTER SYMPTOMS
CHEST TIGHTNESS: 0
ABDOMINAL PAIN: 0
SHORTNESS OF BREATH: 0

## 2021-06-21 NOTE — PROGRESS NOTES
[facility]  Family Medicine Residency Program  Phone: [phone]  Fax: [phone]    Patient:  Tommi Lennox 48 y.o. male                                 Date of Service: 6/21/21                            Chiefcomplaint:   Chief Complaint   Patient presents with    Asthma     6 mo f/u         History of Present Illness: The patient is a 48 y.o. male  presented to the clinic with complaints as above. asthma - Symbicort is working well. Does better on the 160/4.5. Using albuterol very rarely. No wheezing episodes, no cough episodes. Nasal allergies - using flonase and azelastine episodically. Sx are controlled at this time. PSA - normal test, no stream issues, no nocturnal issues. HPL - off statins. The 10-year ASCVD risk score (Madelyn Lee, et al., 2013) is: 3.3%    Values used to calculate the score:      Age: 48 years      Sex: Male      Is Non- : No      Diabetic: No      Tobacco smoker: No      Systolic Blood Pressure: 247 mmHg      Is BP treated: No      HDL Cholesterol: 59 mg/dL      Total Cholesterol: 170 mg/dL        Review of Systems:   Review of Systems   Respiratory: Negative for chest tightness and shortness of breath. Cardiovascular: Negative for chest pain and palpitations. Gastrointestinal: Negative for abdominal pain. Past Medical History:      Diagnosis Date    Anxiety     Asthma     intermittent    Left inguinal pain 10/13/2017    Lymphadenopathy, abdominal 6/10/2015    Incidental on CT 6/2015    Rotator cuff syndrome of left shoulder 9/8/2013    Seasonal allergies        Past Surgical History:        Procedure Laterality Date    COLONOSCOPY  07/01/2017    normal Dr Ana Cristina Bowman Left     meniscus       Allergies:    Patient has no known allergies.     Social History:   Social History     Socioeconomic History    Marital status:      Spouse name: Not on file    Number of children: Not Extremities normal, atraumatic, no cyanosis. +0 edema. Assessment and Plan:         1. Mild intermittent asthma without complication  controlled  - albuterol sulfate  (90 Base) MCG/ACT inhaler; USE 1 TO 2 INHALATIONS EVERY 6 HOURS AS NEEDED  Dispense: 3 Inhaler; Refill: 0  - budesonide-formoterol (SYMBICORT) 160-4.5 MCG/ACT AERO; Inhale 2 puffs into the lungs 2 times daily  Dispense: 3 Inhaler; Refill: 3    2. Other allergic rhinitis  controlled  - fluticasone (FLONASE) 50 MCG/ACT nasal spray; USE 2 SPRAYS NASALLY DAILY  Dispense: 3 Bottle; Refill: 2  - azelastine (ASTELIN) 0.1 % nasal spray; 2 sprays by Nasal route 2 times daily Use in each nostril as directed  Dispense: 1 Bottle; Refill: 3        Return to Office: Return in about 6 months (around 12/21/2021) for asthma. I encourage further reading and education about your health conditions. Information on many healthconditions is provided by the American Academy of Family Physicians: https://familydoctor. org/  Please bring any questions to me at your next visit. This document may have been prepared at least partiallythrough the use of voice recognition software. Although effort is taken to assure the accuracy of this document, it is possible that grammatical, syntax,  or spelling errors may occur.     Medication List:    Current Outpatient Medications   Medication Sig Dispense Refill    albuterol sulfate  (90 Base) MCG/ACT inhaler USE 1 TO 2 INHALATIONS EVERY 6 HOURS AS NEEDED 3 Inhaler 0    budesonide-formoterol (SYMBICORT) 160-4.5 MCG/ACT AERO Inhale 2 puffs into the lungs 2 times daily 3 Inhaler 3    fluticasone (FLONASE) 50 MCG/ACT nasal spray USE 2 SPRAYS NASALLY DAILY 3 Bottle 2    azelastine (ASTELIN) 0.1 % nasal spray 2 sprays by Nasal route 2 times daily Use in each nostril as directed 1 Bottle 3    fluorometholone (FML) 0.1 % ophthalmic suspension       olopatadine (PATANOL) 0.1 % ophthalmic solution        No current facility-administered medications for this visit.         Anikta Abel MD

## 2021-10-01 DIAGNOSIS — J45.20 MILD INTERMITTENT ASTHMA WITHOUT COMPLICATION: ICD-10-CM

## 2021-10-01 RX ORDER — ALBUTEROL SULFATE 90 UG/1
AEROSOL, METERED RESPIRATORY (INHALATION)
Qty: 25.5 G | Refills: 3 | Status: SHIPPED
Start: 2021-10-01 | End: 2022-07-08 | Stop reason: SDUPTHER

## 2021-11-04 ENCOUNTER — TELEPHONE (OUTPATIENT)
Dept: FAMILY MEDICINE CLINIC | Age: 56
End: 2021-11-04

## 2021-11-04 NOTE — TELEPHONE ENCOUNTER
----- Message from Delgadoselene Peterson sent at 11/4/2021 10:11 AM EDT -----  Subject: Appointment Request    Reason for Call: Urgent Cough Cold    QUESTIONS  Type of Appointment? Established Patient  Reason for appointment request? Available appointments did not meet   patient need  Additional Information for Provider? pt has a cough as well as a foot   issue, he is not okay with being seen VV and is wanting to be seen if   office please follow up   ---------------------------------------------------------------------------  --------------  [address]  What is the best way for the office to contact you? OK to leave message on   voicemail  Preferred Call Back Phone Number? [phone]  ---------------------------------------------------------------------------  --------------  SCRIPT ANSWERS  Relationship to Patient? Self  Are you currently unable to finish sentences due to any difficulty   breathing? No  Are you unable to swallow liquids? No  Are you having fevers (100.4 or greater), chills, or sweats? No  Do you have COPD, asthma or a chronic lung condition? Yes   Have you been diagnosed with, awaiting test results for, or told that you   are suspected of having COVID-19 (Coronavirus)? (If patient has tested   negative or was tested as a requirement for work, school, or travel and   not based on symptoms, answer no)? No  Within the past two weeks have you developed any of the following symptoms   (answer no if symptoms have been present longer than 2 weeks or began   more than 2 weeks ago)? Fever or Chills, Cough, Shortness of breath or   difficulty breathing, Loss of taste or smell, Sore throat, Nasal   congestion, Sneezing or runny nose, Fatigue or generalized body aches   (answer no if pain is specific to a body part e.g. back pain), Diarrhea,   Headache?  Yes

## 2022-01-07 ENCOUNTER — OFFICE VISIT (OUTPATIENT)
Dept: FAMILY MEDICINE CLINIC | Age: 57
End: 2022-01-07
Payer: COMMERCIAL

## 2022-01-07 VITALS
WEIGHT: 205 LBS | TEMPERATURE: 97.4 F | HEART RATE: 67 BPM | SYSTOLIC BLOOD PRESSURE: 132 MMHG | RESPIRATION RATE: 18 BRPM | BODY MASS INDEX: 31.07 KG/M2 | OXYGEN SATURATION: 98 % | HEIGHT: 68 IN | DIASTOLIC BLOOD PRESSURE: 68 MMHG

## 2022-01-07 DIAGNOSIS — Z11.59 NEED FOR HEPATITIS C SCREENING TEST: ICD-10-CM

## 2022-01-07 DIAGNOSIS — R03.0 BLOOD PRESSURE ELEVATED WITHOUT HISTORY OF HTN: ICD-10-CM

## 2022-01-07 DIAGNOSIS — M79.672 PAIN IN BOTH FEET: ICD-10-CM

## 2022-01-07 DIAGNOSIS — Z11.4 SCREENING FOR HIV (HUMAN IMMUNODEFICIENCY VIRUS): ICD-10-CM

## 2022-01-07 DIAGNOSIS — J45.20 MILD INTERMITTENT ASTHMA WITHOUT COMPLICATION: Primary | ICD-10-CM

## 2022-01-07 DIAGNOSIS — M79.671 PAIN IN BOTH FEET: ICD-10-CM

## 2022-01-07 PROCEDURE — 99214 OFFICE O/P EST MOD 30 MIN: CPT | Performed by: FAMILY MEDICINE

## 2022-01-07 RX ORDER — BUDESONIDE AND FORMOTEROL FUMARATE DIHYDRATE 160; 4.5 UG/1; UG/1
2 AEROSOL RESPIRATORY (INHALATION) 2 TIMES DAILY
Qty: 3 EACH | Refills: 3 | Status: SHIPPED
Start: 2022-01-07 | End: 2022-07-08 | Stop reason: SDUPTHER

## 2022-01-07 RX ORDER — BUDESONIDE AND FORMOTEROL FUMARATE DIHYDRATE 160; 4.5 UG/1; UG/1
2 AEROSOL RESPIRATORY (INHALATION) 2 TIMES DAILY
Qty: 3 EACH | Refills: 3 | Status: SHIPPED
Start: 2022-01-07 | End: 2022-01-07 | Stop reason: SDUPTHER

## 2022-01-07 RX ORDER — PREDNISONE 20 MG/1
20 TABLET ORAL DAILY
Qty: 10 TABLET | Refills: 0 | Status: SHIPPED | OUTPATIENT
Start: 2022-01-07 | End: 2022-01-17

## 2022-01-07 SDOH — ECONOMIC STABILITY: FOOD INSECURITY: WITHIN THE PAST 12 MONTHS, THE FOOD YOU BOUGHT JUST DIDN'T LAST AND YOU DIDN'T HAVE MONEY TO GET MORE.: NEVER TRUE

## 2022-01-07 SDOH — ECONOMIC STABILITY: FOOD INSECURITY: WITHIN THE PAST 12 MONTHS, YOU WORRIED THAT YOUR FOOD WOULD RUN OUT BEFORE YOU GOT MONEY TO BUY MORE.: NEVER TRUE

## 2022-01-07 ASSESSMENT — PATIENT HEALTH QUESTIONNAIRE - PHQ9
SUM OF ALL RESPONSES TO PHQ QUESTIONS 1-9: 0
SUM OF ALL RESPONSES TO PHQ QUESTIONS 1-9: 0
2. FEELING DOWN, DEPRESSED OR HOPELESS: 0
SUM OF ALL RESPONSES TO PHQ9 QUESTIONS 1 & 2: 0
SUM OF ALL RESPONSES TO PHQ QUESTIONS 1-9: 0
1. LITTLE INTEREST OR PLEASURE IN DOING THINGS: 0
SUM OF ALL RESPONSES TO PHQ QUESTIONS 1-9: 0

## 2022-01-07 ASSESSMENT — ENCOUNTER SYMPTOMS
CHEST TIGHTNESS: 0
ABDOMINAL PAIN: 0
SHORTNESS OF BREATH: 0

## 2022-01-07 ASSESSMENT — SOCIAL DETERMINANTS OF HEALTH (SDOH): HOW HARD IS IT FOR YOU TO PAY FOR THE VERY BASICS LIKE FOOD, HOUSING, MEDICAL CARE, AND HEATING?: NOT HARD AT ALL

## 2022-01-07 NOTE — PROGRESS NOTES
[facility]  Family Medicine Residency  Phone: [phone]  Fax: [phone]    Patient:  Ace Zaidi 55 y.o. male                                 Date of Service: 1/7/22                            Chiefcomplaint:   Chief Complaint   Patient presents with    Asthma     under control     Numbness     feet \"tingling\" - pins & needles x1 month     Health Maintenance     declined flu          History of Present Illness: The patient is a 55 y.o. male  presented to the clinic with complaints as above. Elevated BP - recheck was ok. No CV Sx. Asthma - No wheezing (except very rarely)- tolerating meds. He isn't using albuterol at all any more. Nasal allergies - only needs seasonally at time. Not currently using. Will start in spring or fall when sx begin. Intermittent Contact Dermatitis - uses about once per year if catches this brush hogging. No sx presently. Foot cold sensation starting a couple months ago. No Tingling in does but not elsewhere on the foot. No change in footwear. No change to activities. Review of Systems:   Review of Systems   Respiratory: Negative for chest tightness and shortness of breath. Cardiovascular: Negative for chest pain and palpitations. Gastrointestinal: Negative for abdominal pain. Past Medical History:      Diagnosis Date    Anxiety     Asthma     intermittent    Left inguinal pain 10/13/2017    Lymphadenopathy, abdominal 6/10/2015    Incidental on CT 6/2015    Rotator cuff syndrome of left shoulder 9/8/2013    Seasonal allergies        Past Surgical History:        Procedure Laterality Date    COLONOSCOPY  07/01/2017    normal Dr Juan Carlos De Los Santos Left     meniscus       Allergies:    Patient has no known allergies.     Social History:   Social History     Socioeconomic History    Marital status:      Spouse name: Not on file    Number of children: Not on file    Years of education: Not on file  Highest education level: Not on file   Occupational History    Not on file   Tobacco Use    Smoking status: Never Smoker    Smokeless tobacco: Never Used   Substance and Sexual Activity    Alcohol use: Yes     Comment: social     Drug use: No    Sexual activity: Yes     Partners: Female   Other Topics Concern    Not on file   Social History Narrative    Not on file     Social Determinants of Health     Financial Resource Strain: Low Risk     Difficulty of Paying Living Expenses: Not hard at all   Food Insecurity: No Food Insecurity    Worried About [address] in the Last Year: Never true    [facility] in the Last Year: Never true   Transportation Needs:     Lack of Transportation (Medical): Not on file    Lack of Transportation (Non-Medical):  Not on file   Physical Activity:     Days of Exercise per Week: Not on file    Minutes of Exercise per Session: Not on file   Stress:     Feeling of Stress : Not on file   Social Connections:     Frequency of Communication with Friends and Family: Not on file    Frequency of Social Gatherings with Friends and Family: Not on file    Attends Religion Services: Not on file    Active Member of [address] [place] [address] or Organizations: Not on file    Attends Club or Organization Meetings: Not on file    Marital Status: Not on file   Intimate Partner Violence:     Fear of Current or Ex-Partner: Not on file    Emotionally Abused: Not on file    Physically Abused: Not on file    Sexually Abused: Not on file   Housing Stability:     Unable to Pay for Housing in the Last Year: Not on file    Number of [place] in the Last Year: Not on file    Unstable Housing in the Last Year: Not on file        Family History:       Problem Relation Age of Onset    Diabetes Paternal Grandmother        Physical Exam:    Vitals: /68   Pulse 67   Temp 97.4 °F (36.3 °C)   Resp 18   Ht 5' 8\" (1.727 m)   Wt 205 lb (93 kg)   SpO2 98%   BMI 31.17 kg/m²   BP Readings from Last 3 Encounters:   01/07/22 132/68   06/21/21 122/78   01/04/21 (!) 146/77     General Appearance: Well developed, awake, alert, oriented, no acute distress  Chest wall/Lung: Clear to auscultation bilaterally,  respirations unlabored. No ronchi/wheezing/rales  Heart[de-identified]  Regular rate and rhythm, S1and S2 normal, no murmur, rub or gallop. Extremities:  Extremities normal, atraumatic, no cyanosis. +0 edema. Musculokeletal: ROM grossly normal in all joints of extremities, no obvious joint swelling. Patient is mildly flat-footed there is no tenderness to palpation across the heel across the navicular bone there is negative compression of the metatarsal heads there is no tenderness between the metatarsals. Sensation is grossly intact the feet are warm and pulses are +2 bilaterally  Neurologic:   Alert&Oriented. Normal gait and coordination  No focal neurological deficits appreaciated         Psychiatric: has a normal mood and affect. Behavior is normal.       Assessment and Plan:         1. Mild intermittent asthma without complication  Symptoms well controlled currently not needing rescue inhaler we will continue this dose of controller and follow the patient in approximately 1 year  - budesonide-formoterol (SYMBICORT) 160-4.5 MCG/ACT AERO; Inhale 2 puffs into the lungs 2 times daily  Dispense: 3 each; Refill: 3    2. Screening for HIV (human immunodeficiency virus)  - HIV-1 AND HIV-2 ANTIBODIES; Future    3. Need for hepatitis C screening test  - HEPATITIS C ANTIBODY; Future    4. Pain in both feet  Etiology of foot pain is not determined if this is progressive he needs to be reevaluated. Given the nature of his pain is primarily distal to the metatarsals I suggested either an arch or metatarsal left be tried and if ineffective recommended visit with podiatry    5.  Blood pressure elevated without history of HTN  Blood pressure is controlled on recheck today using a large cuff in the office we will continue to monitor        Return to Office: No follow-ups on file. I encourage further reading and education about your health conditions. Information on many healthconditions is provided by the American Academy of Family Physicians: https://familydoctor. org/  Please bring any questions to me at your next visit. This document may have been prepared at least partiallythrough the use of voice recognition software. Although effort is taken to assure the accuracy of this document, it is possible that grammatical, syntax,  or spelling errors may occur. Medication List:    Current Outpatient Medications   Medication Sig Dispense Refill    predniSONE (DELTASONE) 20 MG tablet Take 1 tablet by mouth daily for 10 days 10 tablet 0    budesonide-formoterol (SYMBICORT) 160-4.5 MCG/ACT AERO Inhale 2 puffs into the lungs 2 times daily 3 each 3    albuterol sulfate  (90 Base) MCG/ACT inhaler USE 1 TO 2 INHALATIONS EVERY 6 HOURS AS NEEDED 25.5 g 3    fluticasone (FLONASE) 50 MCG/ACT nasal spray USE 2 SPRAYS NASALLY DAILY 3 Bottle 2    azelastine (ASTELIN) 0.1 % nasal spray 2 sprays by Nasal route 2 times daily Use in each nostril as directed 1 Bottle 3    fluorometholone (FML) 0.1 % ophthalmic suspension       olopatadine (PATANOL) 0.1 % ophthalmic solution        No current facility-administered medications for this visit.         Ashwin Donaldson MD

## 2022-07-08 ENCOUNTER — OFFICE VISIT (OUTPATIENT)
Dept: FAMILY MEDICINE CLINIC | Age: 57
End: 2022-07-08
Payer: COMMERCIAL

## 2022-07-08 VITALS
TEMPERATURE: 97.8 F | BODY MASS INDEX: 32.13 KG/M2 | OXYGEN SATURATION: 97 % | HEIGHT: 68 IN | SYSTOLIC BLOOD PRESSURE: 118 MMHG | RESPIRATION RATE: 19 BRPM | HEART RATE: 77 BPM | WEIGHT: 212 LBS | DIASTOLIC BLOOD PRESSURE: 77 MMHG

## 2022-07-08 DIAGNOSIS — Z12.5 SCREENING FOR PROSTATE CANCER: ICD-10-CM

## 2022-07-08 DIAGNOSIS — J30.89 OTHER ALLERGIC RHINITIS: ICD-10-CM

## 2022-07-08 DIAGNOSIS — R73.01 IMPAIRED FASTING GLUCOSE: ICD-10-CM

## 2022-07-08 DIAGNOSIS — J30.89 ALLERGIC RHINITIS DUE TO OTHER ALLERGIC TRIGGER, UNSPECIFIED SEASONALITY: ICD-10-CM

## 2022-07-08 DIAGNOSIS — J45.20 MILD INTERMITTENT ASTHMA WITHOUT COMPLICATION: Primary | ICD-10-CM

## 2022-07-08 DIAGNOSIS — Z13.220 SCREENING FOR HYPERLIPIDEMIA: ICD-10-CM

## 2022-07-08 PROCEDURE — 99214 OFFICE O/P EST MOD 30 MIN: CPT | Performed by: FAMILY MEDICINE

## 2022-07-08 RX ORDER — BUDESONIDE AND FORMOTEROL FUMARATE DIHYDRATE 160; 4.5 UG/1; UG/1
2 AEROSOL RESPIRATORY (INHALATION) 2 TIMES DAILY
Qty: 3 EACH | Refills: 3 | Status: SHIPPED | OUTPATIENT
Start: 2022-07-08

## 2022-07-08 RX ORDER — ALBUTEROL SULFATE 90 UG/1
AEROSOL, METERED RESPIRATORY (INHALATION)
Qty: 25.5 G | Refills: 3 | Status: SHIPPED | OUTPATIENT
Start: 2022-07-08

## 2022-07-08 RX ORDER — FLUTICASONE PROPIONATE 50 MCG
SPRAY, SUSPENSION (ML) NASAL
Qty: 3 EACH | Refills: 2 | Status: SHIPPED | OUTPATIENT
Start: 2022-07-08

## 2022-07-08 RX ORDER — AZELASTINE 1 MG/ML
2 SPRAY, METERED NASAL 2 TIMES DAILY
Qty: 3 EACH | Refills: 2 | Status: SHIPPED | OUTPATIENT
Start: 2022-07-08

## 2022-07-08 ASSESSMENT — ENCOUNTER SYMPTOMS
CHEST TIGHTNESS: 0
ABDOMINAL PAIN: 0
SHORTNESS OF BREATH: 0

## 2022-07-08 NOTE — PROGRESS NOTES
[facility]  Phone: [phone]  Fax: [phone]    Patient:  Jamilah Dorsey 55 y.o. male                                 Date of Service: 7/8/22                            Chiefcomplaint:   Chief Complaint   Patient presents with    Asthma    Lower Back Pain     reinjured Memorial Day weekend; getting treatment         History of Present Illness: The patient is a 55 y.o. male  presented to the clinic with complaints as above. asthma - sx well controlled with current dosing of Symbicort. Not using albuterol. Not having wheezing     Allergies/rhinitis - happens seasonally, so far this year sx have been reasonably controlled. Currently off Flonase but will restart as sx warrant. Also using eye drops from optho. Feet coldness from prior note has resolved. Back Pain with sciatic pains down RLE - currently work with chiropractor and achieving good results. The 10-year ASCVD risk score (James Ramirez., et al., 2013) is: 3.5%    Values used to calculate the score:      Age: 55 years      Sex: Male      Is Non- : No      Diabetic: No      Tobacco smoker: No      Systolic Blood Pressure: 580 mmHg      Is BP treated: No      HDL Cholesterol: 59 mg/dL      Total Cholesterol: 170 mg/dL      Review of Systems:   Review of Systems   Respiratory:  Negative for chest tightness and shortness of breath. Cardiovascular:  Negative for chest pain and palpitations. Gastrointestinal:  Negative for abdominal pain.      Past Medical History:      Diagnosis Date    Anxiety     Asthma     intermittent    Left inguinal pain 10/13/2017    Lymphadenopathy, abdominal 6/10/2015    Incidental on CT 6/2015    Rotator cuff syndrome of left shoulder 9/8/2013    Seasonal allergies        Past Surgical History:        Procedure Laterality Date    COLONOSCOPY  07/01/2017    normal Dr Pruitt Elder Left     meniscus       Allergies:    Patient has no known allergies. Social History:   Social History     Socioeconomic History    Marital status:      Spouse name: Not on file    Number of children: Not on file    Years of education: Not on file    Highest education level: Not on file   Occupational History    Not on file   Tobacco Use    Smoking status: Never    Smokeless tobacco: Never   Substance and Sexual Activity    Alcohol use: Yes     Comment: social     Drug use: No    Sexual activity: Yes     Partners: Female   Other Topics Concern    Not on file   Social History Narrative    Not on file     Social Determinants of Health     Financial Resource Strain: Low Risk     Difficulty of Paying Living Expenses: Not hard at all   Food Insecurity: No Food Insecurity    Worried About Running Out of Food in the Last Year: Never true    Ran Out of Food in the Last Year: Never true   Transportation Needs: Not on file   Physical Activity: Not on file   Stress: Not on file   Social Connections: Not on file   Intimate Partner Violence: Not on file   Housing Stability: Not on file        Family History:       Problem Relation Age of Onset    Diabetes Paternal Grandmother        Physical Exam:    Vitals: /77   Pulse 77   Temp 97.8 °F (36.6 °C)   Resp 19   Ht 5' 8\" (1.727 m)   Wt 212 lb (96.2 kg)   SpO2 97%   BMI 32.23 kg/m²   BP Readings from Last 3 Encounters:   07/08/22 118/77   01/07/22 132/68   06/21/21 122/78     General Appearance: Well developed, awake, alert, oriented, no acute distress  HEENT: +nasal congestion +fluid behind TM, no redness   Neck: Supple, symmetrical, trachea midline. No JVD. Chest wall/Lung: Clear to auscultation bilaterally,  respirations unlabored. No ronchi/wheezing/rales  Heart[de-identified]  Regular rate and rhythm, S1and S2 normal, no murmur, rub or gallop. Abdomen: Soft, mild epigastrictenderness, bowel sounds normoactive, no masses, no organomegaly  MSK: there is no ttp over the back/SI joints, neg SLR b/l.  Intact str/gait/balance. Extremities:  Extremities normal, atraumatic, no cyanosis. +0 edema. Neurologic:   Alert&Oriented. Normal gait and coordination  No focal neurological deficits appreaciated         Psychiatric: has a normal mood and affect. Behavior is normal.       Assessment and Plan:         1. Mild intermittent asthma without complication  Meds working great at this dose we will continue current Symbicort and as needed albuterol usage continue to follow every 6 months. - budesonide-formoterol (SYMBICORT) 160-4.5 MCG/ACT AERO; Inhale 2 puffs into the lungs 2 times daily  Dispense: 3 each; Refill: 3  - albuterol sulfate HFA (PROVENTIL;VENTOLIN;PROAIR) 108 (90 Base) MCG/ACT inhaler; 2 puffs QID PRN wheezing  Dispense: 25.5 g; Refill: 3    2. Impaired fasting glucose  - Hemoglobin A1C; Future    3. Screening for prostate cancer  - PSA Screening; Future    4. Screening for hyperlipidemia  - Lipid, Fasting; Future    5. Other allergic rhinitis  Continue Flonase and Astelin as needed seasonally for allergy control  - fluticasone (FLONASE) 50 MCG/ACT nasal spray; USE 2 SPRAYS NASALLY DAILY  Dispense: 3 each; Refill: 2  - azelastine (ASTELIN) 0.1 % nasal spray; 2 sprays by Nasal route 2 times daily Use in each nostril as directed  Dispense: 3 each; Refill: 2    6. Allergic rhinitis due to other allergic trigger, unspecified seasonality  As above      Return to Office: Return in about 6 months (around 1/8/2023) for asthma/allergies. I encourage further reading and education about your health conditions. Information on many healthconditions is provided by the American Academy of Family Physicians: https://familydoctor. org/  Please bring any questions to me at your next visit. This document may have been prepared at least partiallythrough the use of voice recognition software.  Although effort is taken to assure the accuracy of this document, it is possible that grammatical, syntax,  or spelling errors may occur.    Medication List:    Current Outpatient Medications   Medication Sig Dispense Refill    budesonide-formoterol (SYMBICORT) 160-4.5 MCG/ACT AERO Inhale 2 puffs into the lungs 2 times daily 3 each 3    albuterol sulfate HFA (PROVENTIL;VENTOLIN;PROAIR) 108 (90 Base) MCG/ACT inhaler 2 puffs QID PRN wheezing 25.5 g 3    fluticasone (FLONASE) 50 MCG/ACT nasal spray USE 2 SPRAYS NASALLY DAILY 3 each 2    azelastine (ASTELIN) 0.1 % nasal spray 2 sprays by Nasal route 2 times daily Use in each nostril as directed 3 each 2    fluorometholone (FML) 0.1 % ophthalmic suspension       olopatadine (PATANOL) 0.1 % ophthalmic solution        No current facility-administered medications for this visit.         Lissa Garrett MD

## 2023-01-12 ENCOUNTER — OFFICE VISIT (OUTPATIENT)
Dept: FAMILY MEDICINE CLINIC | Age: 58
End: 2023-01-12
Payer: COMMERCIAL

## 2023-01-12 VITALS
HEART RATE: 69 BPM | HEIGHT: 68 IN | BODY MASS INDEX: 31.83 KG/M2 | OXYGEN SATURATION: 98 % | RESPIRATION RATE: 16 BRPM | TEMPERATURE: 98 F | DIASTOLIC BLOOD PRESSURE: 71 MMHG | WEIGHT: 210 LBS | SYSTOLIC BLOOD PRESSURE: 131 MMHG

## 2023-01-12 DIAGNOSIS — Z13.220 SCREENING FOR HYPERLIPIDEMIA: ICD-10-CM

## 2023-01-12 DIAGNOSIS — Z12.5 SCREENING FOR PROSTATE CANCER: ICD-10-CM

## 2023-01-12 DIAGNOSIS — Z11.4 ENCOUNTER FOR SCREENING FOR HIV: ICD-10-CM

## 2023-01-12 DIAGNOSIS — J45.20 MILD INTERMITTENT ASTHMA WITHOUT COMPLICATION: Primary | ICD-10-CM

## 2023-01-12 DIAGNOSIS — R73.01 IMPAIRED FASTING GLUCOSE: ICD-10-CM

## 2023-01-12 DIAGNOSIS — J30.89 OTHER ALLERGIC RHINITIS: ICD-10-CM

## 2023-01-12 DIAGNOSIS — Z11.59 NEED FOR HEPATITIS C SCREENING TEST: ICD-10-CM

## 2023-01-12 LAB
CHOLESTEROL, FASTING: 190 MG/DL (ref 0–199)
HBA1C MFR BLD: 5.4 % (ref 4–5.6)
HDLC SERPL-MCNC: 65 MG/DL
LDL CHOLESTEROL CALCULATED: 109 MG/DL (ref 0–99)
PROSTATE SPECIFIC ANTIGEN: 0.77 NG/ML (ref 0–4)
TRIGLYCERIDE, FASTING: 80 MG/DL (ref 0–149)
VLDLC SERPL CALC-MCNC: 16 MG/DL

## 2023-01-12 PROCEDURE — 99214 OFFICE O/P EST MOD 30 MIN: CPT | Performed by: FAMILY MEDICINE

## 2023-01-12 RX ORDER — ALBUTEROL SULFATE 90 UG/1
AEROSOL, METERED RESPIRATORY (INHALATION)
Qty: 25.5 G | Refills: 3 | Status: SHIPPED | OUTPATIENT
Start: 2023-01-12

## 2023-01-12 RX ORDER — FLUTICASONE PROPIONATE 50 MCG
SPRAY, SUSPENSION (ML) NASAL
Qty: 3 EACH | Refills: 2 | Status: SHIPPED | OUTPATIENT
Start: 2023-01-12

## 2023-01-12 RX ORDER — AZELASTINE 1 MG/ML
2 SPRAY, METERED NASAL 2 TIMES DAILY
Qty: 3 EACH | Refills: 2 | Status: SHIPPED | OUTPATIENT
Start: 2023-01-12

## 2023-01-12 RX ORDER — BUDESONIDE AND FORMOTEROL FUMARATE DIHYDRATE 160; 4.5 UG/1; UG/1
2 AEROSOL RESPIRATORY (INHALATION) 2 TIMES DAILY
Qty: 3 EACH | Refills: 3 | Status: SHIPPED | OUTPATIENT
Start: 2023-01-12

## 2023-01-12 SDOH — ECONOMIC STABILITY: FOOD INSECURITY: WITHIN THE PAST 12 MONTHS, THE FOOD YOU BOUGHT JUST DIDN'T LAST AND YOU DIDN'T HAVE MONEY TO GET MORE.: NEVER TRUE

## 2023-01-12 SDOH — ECONOMIC STABILITY: FOOD INSECURITY: WITHIN THE PAST 12 MONTHS, YOU WORRIED THAT YOUR FOOD WOULD RUN OUT BEFORE YOU GOT MONEY TO BUY MORE.: NEVER TRUE

## 2023-01-12 ASSESSMENT — PATIENT HEALTH QUESTIONNAIRE - PHQ9
1. LITTLE INTEREST OR PLEASURE IN DOING THINGS: 0
SUM OF ALL RESPONSES TO PHQ QUESTIONS 1-9: 0
2. FEELING DOWN, DEPRESSED OR HOPELESS: 0
SUM OF ALL RESPONSES TO PHQ QUESTIONS 1-9: 0
SUM OF ALL RESPONSES TO PHQ QUESTIONS 1-9: 0
SUM OF ALL RESPONSES TO PHQ9 QUESTIONS 1 & 2: 0
SUM OF ALL RESPONSES TO PHQ QUESTIONS 1-9: 0

## 2023-01-12 ASSESSMENT — ENCOUNTER SYMPTOMS
SHORTNESS OF BREATH: 0
CHEST TIGHTNESS: 0
ABDOMINAL PAIN: 0

## 2023-01-12 ASSESSMENT — SOCIAL DETERMINANTS OF HEALTH (SDOH): HOW HARD IS IT FOR YOU TO PAY FOR THE VERY BASICS LIKE FOOD, HOUSING, MEDICAL CARE, AND HEATING?: NOT HARD AT ALL

## 2023-01-12 NOTE — PROGRESS NOTES
[facility]  Family [facility] Residency  Phone: [phone]  Fax: [phone]    Patient:  Celina Tatum 65 y.o. male                                 Date of Service: 1/12/23                            Chiefcomplaint:   Chief Complaint   Patient presents with    Asthma    Allergies    Health Maintenance     Declined all vaccines          History of Present Illness: The patient is a 65 y.o. male  presented to the clinic with complaints as above. Asthma - breathing stable. The increased ICS from several visits ago has worked well. No night time sx. No cough, no wheeze. Impaired fasting glucose - no sx     Allergic Rhinitis - stable for now. NO need to change tx. Flonase is controlling sx. Worst activity is brush hot. Low Back Pain - previously working with chiropractor - Improved significantly but has stopped working with chiropractor. Did have MRI for this. Had disc bulging at L3. Review of Systems:   Review of Systems   Respiratory:  Negative for chest tightness and shortness of breath. Cardiovascular:  Negative for chest pain and palpitations. Gastrointestinal:  Negative for abdominal pain. Past Medical History:      Diagnosis Date    Anxiety     Asthma     intermittent    Left inguinal pain 10/13/2017    Lymphadenopathy, abdominal 6/10/2015    Incidental on CT 6/2015    Rotator cuff syndrome of left shoulder 9/8/2013    Seasonal allergies        Past Surgical History:        Procedure Laterality Date    COLONOSCOPY  07/01/2017    normal Dr Louie Bailey Left     meniscus       Allergies:    Patient has no known allergies.     Social History:   Social History     Socioeconomic History    Marital status:      Spouse name: Not on file    Number of children: Not on file    Years of education: Not on file    Highest education level: Not on file   Occupational History    Not on file   Tobacco Use    Smoking status: Never    Smokeless tobacco: Never Substance and Sexual Activity    Alcohol use: Yes     Comment: social     Drug use: No    Sexual activity: Yes     Partners: Female   Other Topics Concern    Not on file   Social History Narrative    Not on file     Social Determinants of Health     Financial Resource Strain: Low Risk     Difficulty of Paying Living Expenses: Not hard at all   Food Insecurity: No Food Insecurity    Worried About Running Out of Food in the Last Year: Never true    Ran Out of Food in the Last Year: Never true   Transportation Needs: Not on file   Physical Activity: Not on file   Stress: Not on file   Social Connections: Not on file   Intimate Partner Violence: Not on file   Housing Stability: Not on file        Family History:       Problem Relation Age of Onset    Diabetes Paternal Grandmother        Physical Exam:    Vitals: /71   Pulse 69   Temp 98 °F (36.7 °C)   Resp 16   Ht 5' 8\" (1.727 m)   Wt 210 lb (95.3 kg)   SpO2 98%   BMI 31.93 kg/m²   BP Readings from Last 3 Encounters:   01/12/23 131/71   07/08/22 118/77   01/07/22 132/68     General Appearance: Well developed, awake, alert, oriented, no acute distress  HEENT: mild congestion NP b/l with some dullness to TM b/l. No erythema. Neck: Supple, symmetrical, trachea midline. No JVD. Chest wall/Lung: Clear to auscultation bilaterally,  respirations unlabored. No ronchi/wheezing/rales  Heart[de-identified]  Regular rate and rhythm, S1and S2 normal, no murmur, rub or gallop. Extremities:  Extremities normal, atraumatic, no cyanosis. +0 edema. Neurologic:   Alert&Oriented. Normal gait and coordination  No focal neurological deficits appreaciated         Psychiatric: has a normal mood and affect. Behavior is normal.       Assessment and Plan:         1. Mild intermittent asthma without complication  Cont same dose Symbicort. Discussed sx to call office for. - budesonide-formoterol (SYMBICORT) 160-4.5 MCG/ACT AERO;  Inhale 2 puffs into the lungs 2 times daily  Dispense: 3 each; Refill: 3  - albuterol sulfate HFA (PROVENTIL;VENTOLIN;PROAIR) 108 (90 Base) MCG/ACT inhaler; 2 puffs QID PRN wheezing  Dispense: 25.5 g; Refill: 3    2. Impaired fasting glucose  - Hemoglobin A1C; Future    3. Screening for prostate cancer  - PSA Screening; Future    4. Screening for hyperlipidemia  - Lipid, Fasting; Future    5. Other allergic rhinitis  Sx controlled will cont meds. - fluticasone (FLONASE) 50 MCG/ACT nasal spray; USE 2 SPRAYS NASALLY DAILY  Dispense: 3 each; Refill: 2  - azelastine (ASTELIN) 0.1 % nasal spray; 2 sprays by Nasal route 2 times daily Use in each nostril as directed  Dispense: 3 each; Refill: 2    6. Encounter for screening for HIV  - HIV Screen; Future    7. Need for hepatitis C screening test  - Hepatitis C Antibody; Future      Return to Office: Return in about 6 months (around 7/12/2023) for asthma/allergies. I encourage further reading and education about your health conditions. Information on many healthconditions is provided by the American Academy of Family Physicians: https://familydoctor. org/  Please bring any questions to me at your next visit. This document may have been prepared at least partiallythrough the use of voice recognition software. Although effort is taken to assure the accuracy of this document, it is possible that grammatical, syntax,  or spelling errors may occur.     Medication List:    Current Outpatient Medications   Medication Sig Dispense Refill    budesonide-formoterol (SYMBICORT) 160-4.5 MCG/ACT AERO Inhale 2 puffs into the lungs 2 times daily 3 each 3    albuterol sulfate HFA (PROVENTIL;VENTOLIN;PROAIR) 108 (90 Base) MCG/ACT inhaler 2 puffs QID PRN wheezing 25.5 g 3    fluticasone (FLONASE) 50 MCG/ACT nasal spray USE 2 SPRAYS NASALLY DAILY 3 each 2    azelastine (ASTELIN) 0.1 % nasal spray 2 sprays by Nasal route 2 times daily Use in each nostril as directed 3 each 2    fluorometholone (FML) 0.1 % ophthalmic suspension olopatadine (PATANOL) 0.1 % ophthalmic solution        No current facility-administered medications for this visit.         Yo Novoa MD

## 2023-01-13 LAB — HEPATITIS C ANTIBODY INTERPRETATION: NORMAL

## 2023-01-16 LAB
Lab: NORMAL
REPORT: NORMAL
THIS TEST SENT TO: NORMAL

## 2023-07-17 ENCOUNTER — OFFICE VISIT (OUTPATIENT)
Dept: FAMILY MEDICINE CLINIC | Age: 58
End: 2023-07-17
Payer: COMMERCIAL

## 2023-07-17 VITALS
SYSTOLIC BLOOD PRESSURE: 124 MMHG | TEMPERATURE: 98.2 F | BODY MASS INDEX: 31.67 KG/M2 | HEIGHT: 68 IN | WEIGHT: 209 LBS | OXYGEN SATURATION: 98 % | RESPIRATION RATE: 16 BRPM | DIASTOLIC BLOOD PRESSURE: 76 MMHG | HEART RATE: 66 BPM

## 2023-07-17 DIAGNOSIS — J30.89 OTHER ALLERGIC RHINITIS: ICD-10-CM

## 2023-07-17 DIAGNOSIS — J45.20 MILD INTERMITTENT ASTHMA WITHOUT COMPLICATION: ICD-10-CM

## 2023-07-17 PROCEDURE — 99213 OFFICE O/P EST LOW 20 MIN: CPT | Performed by: FAMILY MEDICINE

## 2023-07-17 RX ORDER — ALBUTEROL SULFATE 90 UG/1
AEROSOL, METERED RESPIRATORY (INHALATION)
Qty: 25.5 G | Refills: 3 | Status: SHIPPED
Start: 2023-07-17 | End: 2023-07-17 | Stop reason: SDUPTHER

## 2023-07-17 RX ORDER — BUDESONIDE AND FORMOTEROL FUMARATE DIHYDRATE 160; 4.5 UG/1; UG/1
2 AEROSOL RESPIRATORY (INHALATION) 2 TIMES DAILY
Qty: 3 EACH | Refills: 3 | Status: SHIPPED
Start: 2023-07-17 | End: 2023-07-17 | Stop reason: SDUPTHER

## 2023-07-17 RX ORDER — ECHINACEA PURPUREA EXTRACT 125 MG
1 TABLET ORAL PRN
Qty: 1 EACH | Refills: 3 | Status: SHIPPED | OUTPATIENT
Start: 2023-07-17

## 2023-07-17 RX ORDER — BUDESONIDE AND FORMOTEROL FUMARATE DIHYDRATE 160; 4.5 UG/1; UG/1
2 AEROSOL RESPIRATORY (INHALATION) 2 TIMES DAILY
Qty: 3 EACH | Refills: 3 | Status: SHIPPED | OUTPATIENT
Start: 2023-07-17

## 2023-07-17 RX ORDER — ALBUTEROL SULFATE 90 UG/1
AEROSOL, METERED RESPIRATORY (INHALATION)
Qty: 25.5 G | Refills: 3 | Status: SHIPPED | OUTPATIENT
Start: 2023-07-17

## 2023-07-17 SDOH — ECONOMIC STABILITY: HOUSING INSECURITY
IN THE LAST 12 MONTHS, WAS THERE A TIME WHEN YOU DID NOT HAVE A STEADY PLACE TO SLEEP OR SLEPT IN A SHELTER (INCLUDING NOW)?: NO

## 2023-07-17 SDOH — ECONOMIC STABILITY: INCOME INSECURITY: HOW HARD IS IT FOR YOU TO PAY FOR THE VERY BASICS LIKE FOOD, HOUSING, MEDICAL CARE, AND HEATING?: NOT HARD AT ALL

## 2023-07-17 SDOH — ECONOMIC STABILITY: FOOD INSECURITY: WITHIN THE PAST 12 MONTHS, THE FOOD YOU BOUGHT JUST DIDN'T LAST AND YOU DIDN'T HAVE MONEY TO GET MORE.: NEVER TRUE

## 2023-07-17 SDOH — ECONOMIC STABILITY: FOOD INSECURITY: WITHIN THE PAST 12 MONTHS, YOU WORRIED THAT YOUR FOOD WOULD RUN OUT BEFORE YOU GOT MONEY TO BUY MORE.: NEVER TRUE

## 2023-07-17 ASSESSMENT — ENCOUNTER SYMPTOMS
SHORTNESS OF BREATH: 0
ABDOMINAL PAIN: 0
CHEST TIGHTNESS: 0

## 2023-07-17 NOTE — PROGRESS NOTES
[facility]  [facility]  Phone: [phone]  Fax: [phone]    Patient:  Mayuri Jasso 65 y.o. male                                 Date of Service: 7/17/23                            Chiefcomplaint:   Chief Complaint   Patient presents with    Allergies    Asthma         History of Present Illness: The patient is a 65 y.o. male  presented to the clinic with complaints as above. asthma - sx well controlled. Doing much better with the combination inhaler. Does not wish to make changes. Allergies - hasn't needed the Flonase daily for sx control. We discussed using thsi seasonally instead of PRN. He wants to try nasal saline. Review of Systems:   Review of Systems   Respiratory:  Negative for chest tightness and shortness of breath. Cardiovascular:  Negative for chest pain and palpitations. Gastrointestinal:  Negative for abdominal pain. Past Medical History:      Diagnosis Date    Anxiety     Asthma     intermittent    Left inguinal pain 10/13/2017    Lymphadenopathy, abdominal 6/10/2015    Incidental on CT 6/2015    Rotator cuff syndrome of left shoulder 9/8/2013    Seasonal allergies        Past Surgical History:        Procedure Laterality Date    COLONOSCOPY  07/01/2017    normal Dr Silke Gamble Left     meniscus       Allergies:    Patient has no known allergies.     Social History:   Social History     Socioeconomic History    Marital status:      Spouse name: Not on file    Number of children: Not on file    Years of education: Not on file    Highest education level: Not on file   Occupational History    Not on file   Tobacco Use    Smoking status: Never    Smokeless tobacco: Never   Substance and Sexual Activity    Alcohol use: Yes     Comment: social     Drug use: No    Sexual activity: Yes     Partners: Female   Other Topics Concern    Not on file   Social History Narrative    Not on file     Social Determinants of Health

## 2023-08-17 ENCOUNTER — TELEPHONE (OUTPATIENT)
Dept: FAMILY MEDICINE CLINIC | Age: 58
End: 2023-08-17

## 2023-08-17 NOTE — TELEPHONE ENCOUNTER
Redness/swelling/pain are the most common sx requiring treatment. He should keep in mind that the complaint of \"spider bite\" is typical presentation of cellulitis, therefore the above sx if developing may require abx. Keep us posted.

## 2023-08-17 NOTE — TELEPHONE ENCOUNTER
Patient was bit by a spider on his toe, no redness or swelling yet. He wonders what he should be watching for in the future.

## 2023-09-15 ENCOUNTER — OFFICE VISIT (OUTPATIENT)
Dept: FAMILY MEDICINE CLINIC | Age: 58
End: 2023-09-15

## 2023-09-15 VITALS
DIASTOLIC BLOOD PRESSURE: 68 MMHG | OXYGEN SATURATION: 96 % | SYSTOLIC BLOOD PRESSURE: 126 MMHG | WEIGHT: 201 LBS | RESPIRATION RATE: 17 BRPM | HEART RATE: 78 BPM | TEMPERATURE: 98.1 F | BODY MASS INDEX: 30.56 KG/M2

## 2023-09-15 DIAGNOSIS — J06.9 VIRAL URI WITH COUGH: Primary | ICD-10-CM

## 2023-09-15 RX ORDER — BENZONATATE 100 MG/1
100 CAPSULE ORAL 3 TIMES DAILY PRN
Qty: 30 CAPSULE | Refills: 0 | Status: SHIPPED | OUTPATIENT
Start: 2023-09-15 | End: 2023-09-25

## 2023-09-15 RX ORDER — LORATADINE 10 MG/1
10 TABLET ORAL DAILY
Qty: 15 TABLET | Refills: 0 | Status: SHIPPED | OUTPATIENT
Start: 2023-09-15

## 2023-09-15 ASSESSMENT — ENCOUNTER SYMPTOMS
RHINORRHEA: 1
ABDOMINAL DISTENTION: 0
DIARRHEA: 0
SHORTNESS OF BREATH: 0
CHEST TIGHTNESS: 0
ABDOMINAL PAIN: 0
COUGH: 1
SORE THROAT: 0
WHEEZING: 0
NAUSEA: 0
STRIDOR: 0
VOMITING: 0

## 2023-09-15 NOTE — PROGRESS NOTES
[id number] [facility] Family [facility] Residency Primary Care  Department of Family Medicine      Patient:  Ernst George 65 y.o. male     Date of Service: 9/15/23      Chief complaint:   Chief Complaint   Patient presents with    Cough     Non-productive cough ongoing for 1 week, using mucinexDM          History ofPresent Illness   The patient is a 65 y.o. male  presented to the clinic with complaints as above. -Patient symptoms that started 1 week ago with cough 09/06/23 mostly dry sometimes productive, green, no blood appreciated. Patient mentioned that the cough is present during the whole day, it does not occur in a particular time. Pt mentioned that that a couple of times pt has woken up in the middle of the night because of the cough. Denied N/V/F/ No changes BM or urination. He mentioned he has some chest pain but he believes it is because of the cough he has had some mild headaches as well. Denies shortness of breath palpitations or leg swelling. Last couple of days patient has felt a little bit tired than usually, a little bit of body aches, some chills couple of days ago. Patient refers some runny nose 09/11/23, has not gotten worse or better.  -Patient took for his headache aspirin 300 mg 1 a day, motrin once. Pt did not want to try something else. - Pt tried Mucinex 2 pills every 12 hrs with mild improvement  - Pt refers that he is son had 3 days ago cough with fever as well as his wife today. Today patient reports his symptoms are the same as 1 week ago, symptoms has not gotten better or worse.     Past Medical History:      Diagnosis Date    Anxiety     Asthma     intermittent    Left inguinal pain 10/13/2017    Lymphadenopathy, abdominal 6/10/2015    Incidental on CT 6/2015    Rotator cuff syndrome of left shoulder 9/8/2013    Seasonal allergies        PastSurgical History:        Procedure Laterality Date    COLONOSCOPY  07/01/2017    normal Dr Tere Kaba Left     meniscus

## 2023-12-06 NOTE — TELEPHONE ENCOUNTER
Forms signed by provider and send back to forms.    Patient notified and verbalizes understanding and will keep us posted

## 2024-01-22 ENCOUNTER — OFFICE VISIT (OUTPATIENT)
Dept: FAMILY MEDICINE CLINIC | Age: 59
End: 2024-01-22
Payer: COMMERCIAL

## 2024-01-22 VITALS
TEMPERATURE: 97.6 F | HEART RATE: 64 BPM | BODY MASS INDEX: 30.77 KG/M2 | RESPIRATION RATE: 17 BRPM | WEIGHT: 203 LBS | OXYGEN SATURATION: 98 % | DIASTOLIC BLOOD PRESSURE: 71 MMHG | HEIGHT: 68 IN | SYSTOLIC BLOOD PRESSURE: 135 MMHG

## 2024-01-22 DIAGNOSIS — J45.20 MILD INTERMITTENT ASTHMA WITHOUT COMPLICATION: ICD-10-CM

## 2024-01-22 DIAGNOSIS — J06.9 VIRAL URI WITH COUGH: ICD-10-CM

## 2024-01-22 DIAGNOSIS — Z11.4 SCREENING FOR HIV (HUMAN IMMUNODEFICIENCY VIRUS): ICD-10-CM

## 2024-01-22 DIAGNOSIS — J30.9 ALLERGIC RHINITIS, UNSPECIFIED SEASONALITY, UNSPECIFIED TRIGGER: ICD-10-CM

## 2024-01-22 DIAGNOSIS — Z12.5 SCREENING PSA (PROSTATE SPECIFIC ANTIGEN): Primary | ICD-10-CM

## 2024-01-22 DIAGNOSIS — Z13.220 SCREENING, LIPID: ICD-10-CM

## 2024-01-22 LAB
CHOLESTEROL, FASTING: 199 MG/DL
HDLC SERPL-MCNC: 70 MG/DL
LDL CHOLESTEROL: 115 MG/DL
PROSTATE SPECIFIC ANTIGEN: 0.58 NG/ML (ref 0–4)
TRIGLYCERIDE, FASTING: 72 MG/DL
VLDLC SERPL CALC-MCNC: 14 MG/DL

## 2024-01-22 PROCEDURE — 99214 OFFICE O/P EST MOD 30 MIN: CPT | Performed by: FAMILY MEDICINE

## 2024-01-22 RX ORDER — ALBUTEROL SULFATE 90 UG/1
AEROSOL, METERED RESPIRATORY (INHALATION)
Qty: 25.5 G | Refills: 3 | Status: SHIPPED | OUTPATIENT
Start: 2024-01-22

## 2024-01-22 RX ORDER — LORATADINE 10 MG/1
10 TABLET ORAL DAILY
Qty: 90 TABLET | Refills: 3 | Status: SHIPPED | OUTPATIENT
Start: 2024-01-22

## 2024-01-22 RX ORDER — ECHINACEA PURPUREA EXTRACT 125 MG
1 TABLET ORAL PRN
Qty: 1 EACH | Refills: 3 | Status: SHIPPED | OUTPATIENT
Start: 2024-01-22

## 2024-01-22 RX ORDER — BUDESONIDE AND FORMOTEROL FUMARATE DIHYDRATE 160; 4.5 UG/1; UG/1
2 AEROSOL RESPIRATORY (INHALATION) 2 TIMES DAILY
Qty: 3 EACH | Refills: 3 | Status: SHIPPED | OUTPATIENT
Start: 2024-01-22

## 2024-01-22 ASSESSMENT — ENCOUNTER SYMPTOMS
CHEST TIGHTNESS: 0
ABDOMINAL PAIN: 0
SHORTNESS OF BREATH: 0

## 2024-01-22 ASSESSMENT — PATIENT HEALTH QUESTIONNAIRE - PHQ9
SUM OF ALL RESPONSES TO PHQ QUESTIONS 1-9: 0
1. LITTLE INTEREST OR PLEASURE IN DOING THINGS: 0
SUM OF ALL RESPONSES TO PHQ9 QUESTIONS 1 & 2: 0
SUM OF ALL RESPONSES TO PHQ QUESTIONS 1-9: 0
2. FEELING DOWN, DEPRESSED OR HOPELESS: 0

## 2024-01-22 NOTE — PROGRESS NOTES
patient to call with any new medication issues, and, as applicable, read all Rx info from pharmacy to assure aware of all possible risks and side effects of medicationbefore taking.     Patient and/or guardian given opportunity to ask questions/raise concerns.  The patient verbalized comfort and understanding ofinstructions.    I encourage further reading and education about your health conditions.  Information on many health conditions is provided by the[facility] [employer]: [email or web address]  Please bring any questions to me at your nextvisit.    Medication List:    Current Outpatient Medications   Medication Sig Dispense Refill    albuterol sulfate HFA (PROVENTIL;VENTOLIN;PROAIR) 108 (90 Base) MCG/ACT inhaler 2 puffs QID PRN wheezing 25.5 g 3    budesonide-formoterol (SYMBICORT) 160-4.5 MCG/ACT AERO Inhale 2 puffs into the lungs 2 times daily 3 each 3    loratadine (CLARITIN) 10 MG tablet Take 1 tablet by mouth daily 90 tablet 3    sodium chloride (ALTAMIST SPRAY) 0.65 % nasal spray 1 spray by Nasal route as needed for Congestion 1 each 3    fluorometholone (FML) 0.1 % ophthalmic suspension       olopatadine (PATANOL) 0.1 % ophthalmic solution        No current facility-administered medications for this visit.        Trey Kern MD       This document may have been prepared at least partially through the use of voice recognition software. Although effort is taken to assure the accuracy ofthis document, it is possible that grammatical, syntax,  or spelling errors may occur.

## 2024-01-23 LAB — HIV AG/AB: NONREACTIVE

## 2024-03-11 ENCOUNTER — OFFICE VISIT (OUTPATIENT)
Dept: FAMILY MEDICINE CLINIC | Age: 59
End: 2024-03-11
Payer: COMMERCIAL

## 2024-03-11 VITALS
HEART RATE: 100 BPM | BODY MASS INDEX: 30.77 KG/M2 | RESPIRATION RATE: 16 BRPM | TEMPERATURE: 97.2 F | HEIGHT: 68 IN | SYSTOLIC BLOOD PRESSURE: 137 MMHG | OXYGEN SATURATION: 98 % | WEIGHT: 203 LBS | DIASTOLIC BLOOD PRESSURE: 89 MMHG

## 2024-03-11 DIAGNOSIS — B96.89 ACUTE BACTERIAL SINUSITIS: Primary | ICD-10-CM

## 2024-03-11 DIAGNOSIS — J45.909 UNCOMPLICATED ASTHMA, UNSPECIFIED ASTHMA SEVERITY, UNSPECIFIED WHETHER PERSISTENT: Chronic | ICD-10-CM

## 2024-03-11 DIAGNOSIS — J01.90 ACUTE BACTERIAL SINUSITIS: Primary | ICD-10-CM

## 2024-03-11 PROCEDURE — 99213 OFFICE O/P EST LOW 20 MIN: CPT

## 2024-03-11 RX ORDER — AMOXICILLIN AND CLAVULANATE POTASSIUM 875; 125 MG/1; MG/1
1 TABLET, FILM COATED ORAL 2 TIMES DAILY
Qty: 14 TABLET | Refills: 0 | Status: SHIPPED | OUTPATIENT
Start: 2024-03-11 | End: 2024-03-18

## 2024-03-11 RX ORDER — BUDESONIDE AND FORMOTEROL FUMARATE DIHYDRATE 160; 4.5 UG/1; UG/1
2 AEROSOL RESPIRATORY (INHALATION) 2 TIMES DAILY
Qty: 10.2 G | Refills: 3 | Status: SHIPPED
Start: 2024-03-11 | End: 2024-03-11

## 2024-03-11 RX ORDER — BROMPHENIRAMINE MALEATE, PSEUDOEPHEDRINE HYDROCHLORIDE, AND DEXTROMETHORPHAN HYDROBROMIDE 2; 30; 10 MG/5ML; MG/5ML; MG/5ML
5 SYRUP ORAL 4 TIMES DAILY PRN
Qty: 473 ML | Refills: 0 | Status: SHIPPED | OUTPATIENT
Start: 2024-03-11

## 2024-03-11 RX ORDER — FLUTICASONE PROPIONATE 50 MCG
2 SPRAY, SUSPENSION (ML) NASAL DAILY
Qty: 48 G | Refills: 1 | Status: SHIPPED | OUTPATIENT
Start: 2024-03-11

## 2024-03-11 RX ORDER — BUDESONIDE AND FORMOTEROL FUMARATE DIHYDRATE 80; 4.5 UG/1; UG/1
2 AEROSOL RESPIRATORY (INHALATION) 2 TIMES DAILY
Qty: 10.2 G | Refills: 3 | Status: SHIPPED | OUTPATIENT
Start: 2024-03-11

## 2024-03-11 ASSESSMENT — ENCOUNTER SYMPTOMS
SHORTNESS OF BREATH: 0
RHINORRHEA: 1
STRIDOR: 0
WHEEZING: 1
SORE THROAT: 0
SINUS PRESSURE: 1
CHEST TIGHTNESS: 0
VOMITING: 0
CONSTIPATION: 0
DIARRHEA: 0
COUGH: 1
SINUS PAIN: 0

## 2024-03-11 NOTE — PROGRESS NOTES
S: 58 y.o. male with   Chief Complaint   Patient presents with    Cough     Taking breathing treatments.    Congestion     For just over a week, green mucous with some blood    Headache       8 days  Sinus congestion, runny nose, thick green discharge, headaches, night sweats once, progressively getting worse  Mucinex, bromfed, motrin  H/o asthma on symbicort, no wheezing or SOA  Not been taking flonase or claritin    O: VS:  height is 1.727 m (5' 7.99\") and weight is 92.1 kg (203 lb). His temporal temperature is 97.2 °F (36.2 °C). His blood pressure is 137/89 and his pulse is 100. His respiration is 16 and oxygen saturation is 98%.   BP Readings from Last 3 Encounters:   03/11/24 137/89   01/22/24 135/71   09/15/23 126/68     See resident note      Impression/Plan:   1) Acute sinusitis: Treat with augmentin; start home claritin and flonase; symptomatic/supportive therapy        Health Maintenance Due   Topic Date Due    Hepatitis B vaccine (1 of 3 - 3-dose series) Never done    COVID-19 Vaccine (1) Never done    Pneumococcal 0-64 years Vaccine (2 - PCV) 04/14/2016    Shingles vaccine (1 of 2) Never done    Flu vaccine (1) 08/01/2023         Attending Physician Statement  I have discussed the case, including pertinent history and exam findings with the resident.  I agree with the documented assessment and plan.      Roxanne Valiente MD

## 2024-03-11 NOTE — PROGRESS NOTES
[facility]  Department of Family Medicine  Family Medicine Residency Program      Patient: Dougie Arias 58 y.o. male     Date of Service: 3/11/24      Chief complaint:   Chief Complaint   Patient presents with    Cough     Taking breathing treatments.    Congestion     For just over a week, green mucous with some blood    Headache       HISTORY OF PRESENTING ILLNESS     58 y.o. male presented to the clinic for complaints above     Sinus congestion x 1 week  Hx of asthma  Reports 1 week hx of sinus congestion, intermittent rhinorrhea with thick green discharge, head ache, one night of night sweats  States he is progressively feeling worse  Taking Mucinex, motrin with minimal relief. Bromphed has helped for cough  Denies fevers, chest pain, chest tightness, wheezing  Has not taken Claritin or Flonase    Health Maintenance:  Health Maintenance Due   Topic Date Due    Hepatitis B vaccine (1 of 3 - 3-dose series) Never done    COVID-19 Vaccine (1) Never done    Pneumococcal 0-64 years Vaccine (2 - PCV) 04/14/2016    Shingles vaccine (1 of 2) Never done    Flu vaccine (1) 08/01/2023     Past Medical History:      Diagnosis Date    Anxiety     Asthma     intermittent    Left inguinal pain 10/13/2017    Lymphadenopathy, abdominal 6/10/2015    Incidental on CT 6/2015    Rotator cuff syndrome of left shoulder 9/8/2013    Seasonal allergies      Past Surgical History:        Procedure Laterality Date    COLONOSCOPY  07/01/2017    normal Dr Murillo    KNEE SURGERY Left     meniscus     Allergies:    Patient has no known allergies.  Social History:   Social History     Socioeconomic History    Marital status:      Spouse name: Not on file    Number of children: Not on file    Years of education: Not on file    Highest education level: Not on file   Occupational History    Not on file   Tobacco Use    Smoking status: Never    Smokeless tobacco: Never   Substance and Sexual Activity    Alcohol use:

## 2024-05-06 DIAGNOSIS — J45.909 UNCOMPLICATED ASTHMA, UNSPECIFIED ASTHMA SEVERITY, UNSPECIFIED WHETHER PERSISTENT: Chronic | ICD-10-CM

## 2024-05-06 RX ORDER — BUDESONIDE AND FORMOTEROL FUMARATE DIHYDRATE 80; 4.5 UG/1; UG/1
2 AEROSOL RESPIRATORY (INHALATION) 2 TIMES DAILY
Qty: 30.6 G | Refills: 0 | Status: SHIPPED | OUTPATIENT
Start: 2024-05-06

## 2024-05-06 NOTE — TELEPHONE ENCOUNTER
Reached out to patient by phone to follow up on RX request for Breyna Aer 80-4.5 Mcg/Act from [facility]. Patient states he would like a 90-day prescription sent to them, but recently found that the cost of the lower dose may not save him as much as it did previously. He will let us know if his breathing is not controlled so we can send the higher dose if needed.     90-day RX pended for provider approval  Requested Prescriptions     Pending Prescriptions Disp Refills    budesonide-formoterol (BREYNA) 80-4.5 MCG/ACT AERO 30.6 g 0     Sig: Inhale 2 puffs into the lungs 2 times daily        Yes

## 2024-07-07 DIAGNOSIS — J45.909 UNCOMPLICATED ASTHMA, UNSPECIFIED ASTHMA SEVERITY, UNSPECIFIED WHETHER PERSISTENT: Chronic | ICD-10-CM

## 2024-07-08 RX ORDER — BUDESONIDE AND FORMOTEROL FUMARATE DIHYDRATE 80; 4.5 UG/1; UG/1
AEROSOL RESPIRATORY (INHALATION)
Qty: 30.6 G | Refills: 3 | Status: SHIPPED | OUTPATIENT
Start: 2024-07-08

## 2024-07-08 NOTE — TELEPHONE ENCOUNTER
Last Appointment:  1/22/2024  Future Appointments   Date Time Provider Department Center   1/23/2025  8:30 AM Trey Kern MD [facility]

## 2024-09-12 ENCOUNTER — OFFICE VISIT (OUTPATIENT)
Dept: FAMILY MEDICINE CLINIC | Age: 59
End: 2024-09-12
Payer: COMMERCIAL

## 2024-09-12 VITALS
OXYGEN SATURATION: 97 % | BODY MASS INDEX: 31.25 KG/M2 | HEIGHT: 68 IN | HEART RATE: 75 BPM | RESPIRATION RATE: 18 BRPM | SYSTOLIC BLOOD PRESSURE: 132 MMHG | TEMPERATURE: 97.9 F | WEIGHT: 206.2 LBS | DIASTOLIC BLOOD PRESSURE: 78 MMHG

## 2024-09-12 DIAGNOSIS — M79.604 RIGHT LEG PAIN: Primary | ICD-10-CM

## 2024-09-12 PROCEDURE — 99213 OFFICE O/P EST LOW 20 MIN: CPT

## 2024-09-12 SDOH — ECONOMIC STABILITY: FOOD INSECURITY: WITHIN THE PAST 12 MONTHS, THE FOOD YOU BOUGHT JUST DIDN'T LAST AND YOU DIDN'T HAVE MONEY TO GET MORE.: NEVER TRUE

## 2024-09-12 SDOH — ECONOMIC STABILITY: INCOME INSECURITY: HOW HARD IS IT FOR YOU TO PAY FOR THE VERY BASICS LIKE FOOD, HOUSING, MEDICAL CARE, AND HEATING?: NOT HARD AT ALL

## 2024-09-12 SDOH — ECONOMIC STABILITY: FOOD INSECURITY: WITHIN THE PAST 12 MONTHS, YOU WORRIED THAT YOUR FOOD WOULD RUN OUT BEFORE YOU GOT MONEY TO BUY MORE.: NEVER TRUE

## 2024-09-12 ASSESSMENT — ENCOUNTER SYMPTOMS
NAUSEA: 0
COUGH: 0
EYE PAIN: 0
SHORTNESS OF BREATH: 0
CONSTIPATION: 0
DIARRHEA: 0
RHINORRHEA: 0
ABDOMINAL PAIN: 0

## 2025-01-23 ENCOUNTER — OFFICE VISIT (OUTPATIENT)
Dept: FAMILY MEDICINE CLINIC | Age: 60
End: 2025-01-23

## 2025-01-23 VITALS
WEIGHT: 205 LBS | HEIGHT: 68 IN | HEART RATE: 78 BPM | SYSTOLIC BLOOD PRESSURE: 124 MMHG | BODY MASS INDEX: 31.07 KG/M2 | DIASTOLIC BLOOD PRESSURE: 67 MMHG | RESPIRATION RATE: 14 BRPM | OXYGEN SATURATION: 99 % | TEMPERATURE: 98.1 F

## 2025-01-23 DIAGNOSIS — J45.909 UNCOMPLICATED ASTHMA, UNSPECIFIED ASTHMA SEVERITY, UNSPECIFIED WHETHER PERSISTENT: Primary | ICD-10-CM

## 2025-01-23 DIAGNOSIS — J01.90 ACUTE BACTERIAL SINUSITIS: ICD-10-CM

## 2025-01-23 DIAGNOSIS — J34.89 SINUS PRESSURE: ICD-10-CM

## 2025-01-23 DIAGNOSIS — M77.12 LEFT TENNIS ELBOW: ICD-10-CM

## 2025-01-23 DIAGNOSIS — J30.9 ALLERGIC RHINITIS, UNSPECIFIED SEASONALITY, UNSPECIFIED TRIGGER: ICD-10-CM

## 2025-01-23 DIAGNOSIS — B96.89 ACUTE BACTERIAL SINUSITIS: ICD-10-CM

## 2025-01-23 RX ORDER — BROMPHENIRAMINE MALEATE, PSEUDOEPHEDRINE HYDROCHLORIDE, AND DEXTROMETHORPHAN HYDROBROMIDE 2; 30; 10 MG/5ML; MG/5ML; MG/5ML
5 SYRUP ORAL 4 TIMES DAILY PRN
Qty: 473 ML | Refills: 0 | Status: SHIPPED | OUTPATIENT
Start: 2025-01-23

## 2025-01-23 SDOH — ECONOMIC STABILITY: FOOD INSECURITY: WITHIN THE PAST 12 MONTHS, THE FOOD YOU BOUGHT JUST DIDN'T LAST AND YOU DIDN'T HAVE MONEY TO GET MORE.: NEVER TRUE

## 2025-01-23 SDOH — ECONOMIC STABILITY: FOOD INSECURITY: WITHIN THE PAST 12 MONTHS, YOU WORRIED THAT YOUR FOOD WOULD RUN OUT BEFORE YOU GOT MONEY TO BUY MORE.: NEVER TRUE

## 2025-01-23 ASSESSMENT — PATIENT HEALTH QUESTIONNAIRE - PHQ9
SUM OF ALL RESPONSES TO PHQ QUESTIONS 1-9: 0
SUM OF ALL RESPONSES TO PHQ QUESTIONS 1-9: 0
2. FEELING DOWN, DEPRESSED OR HOPELESS: NOT AT ALL
1. LITTLE INTEREST OR PLEASURE IN DOING THINGS: NOT AT ALL
SUM OF ALL RESPONSES TO PHQ QUESTIONS 1-9: 0
SUM OF ALL RESPONSES TO PHQ QUESTIONS 1-9: 0
SUM OF ALL RESPONSES TO PHQ9 QUESTIONS 1 & 2: 0

## 2025-01-23 ASSESSMENT — ENCOUNTER SYMPTOMS
ABDOMINAL PAIN: 0
RHINORRHEA: 1
SHORTNESS OF BREATH: 0
CHEST TIGHTNESS: 0
COUGH: 1

## 2025-01-23 NOTE — PROGRESS NOTES
[facility]  Department of Family Medicine  Family Medicine Residency Program      Patient:  Dougie Arias 59 y.o. male  Date of Service: 1/23/25      Chief complaint:   Chief Complaint   Patient presents with    Allergies    Asthma    Elbow Pain     Left     Health Maintenance     Flu & Pneumo declined        Assessment and Plan   1. Uncomplicated asthma, unspecified asthma severity, unspecified whether persistent  Controlled - will refill     2. Left tennis elbow  Stretches provided     3. Sinus pressure  Today likely viral - will tx bacterial next week if he calls back and sx worsening      Return to Office: Return in about 7 months (around 8/23/2025) for asthma.    History ofPresent Illness   The patient is a 59 y.o. male  presented to the clinic with complaints as above.    sinusitis - did have nasal congestion and he tx symptomatically.  He is exposed to son who is ill. He has had this for 1-2 days but this is a change. No F/C.  NO cough. No ST. No otalgia. This is distinctly different than allergies which are reasonably controlled.     Asthma - stable breathing.  Only having sx or rarely needs rescue if missing controller.     L elbow pains for a month but improving.     Review of Systems:   Review of Systems   HENT:  Positive for congestion and rhinorrhea.    Respiratory:  Positive for cough. Negative for chest tightness and shortness of breath.    Cardiovascular:  Negative for chest pain and palpitations.   Gastrointestinal:  Negative for abdominal pain.       Physical Exam   Vitals: /67   Pulse 78   Temp 98.1 °F (36.7 °C)   Resp 14   Ht 1.727 m (5' 8\")   Wt 93 kg (205 lb)   SpO2 99%   BMI 31.17 kg/m²   BP Readings from Last 3 Encounters:   01/23/25 124/67   09/12/24 132/78   03/11/24 137/89     Physical Exam  Constitutional:       Appearance: Normal appearance. He is well-developed.   HENT:      Head: Normocephalic and atraumatic.      Right Ear: Tympanic membrane

## 2025-03-07 ENCOUNTER — OFFICE VISIT (OUTPATIENT)
Dept: FAMILY MEDICINE CLINIC | Age: 60
End: 2025-03-07
Payer: COMMERCIAL

## 2025-03-07 VITALS
OXYGEN SATURATION: 98 % | TEMPERATURE: 98.6 F | SYSTOLIC BLOOD PRESSURE: 137 MMHG | RESPIRATION RATE: 18 BRPM | HEIGHT: 68 IN | BODY MASS INDEX: 31.07 KG/M2 | WEIGHT: 205 LBS | HEART RATE: 71 BPM | DIASTOLIC BLOOD PRESSURE: 80 MMHG

## 2025-03-07 DIAGNOSIS — J45.901 ASTHMA WITH ACUTE EXACERBATION, UNSPECIFIED ASTHMA SEVERITY, UNSPECIFIED WHETHER PERSISTENT: ICD-10-CM

## 2025-03-07 DIAGNOSIS — R05.1 ACUTE COUGH: Primary | ICD-10-CM

## 2025-03-07 LAB
INFLUENZA A ANTIGEN, POC: NEGATIVE
INFLUENZA B ANTIGEN, POC: NEGATIVE
Lab: NORMAL
PERFORMING INSTRUMENT: NORMAL
QC PASS/FAIL: NORMAL
SARS-COV-2, POC: NORMAL

## 2025-03-07 PROCEDURE — 87426 SARSCOV CORONAVIRUS AG IA: CPT | Performed by: FAMILY MEDICINE

## 2025-03-07 PROCEDURE — 87804 INFLUENZA ASSAY W/OPTIC: CPT | Performed by: FAMILY MEDICINE

## 2025-03-07 PROCEDURE — 99214 OFFICE O/P EST MOD 30 MIN: CPT | Performed by: FAMILY MEDICINE

## 2025-03-07 RX ORDER — PREDNISONE 20 MG/1
20 TABLET ORAL 2 TIMES DAILY
Qty: 14 TABLET | Refills: 0 | Status: SHIPPED | OUTPATIENT
Start: 2025-03-07 | End: 2025-03-14

## 2025-03-07 NOTE — PROGRESS NOTES
[facility]  Department of Family Medicine  Family Medicine Residency Program      Patient:  Dougie Arias 59 y.o. male  Date of Service: 3/7/25      Chief complaint:   Chief Complaint   Patient presents with    Cold Symptoms     Onset Saturday 3/1, productive cough and wheeze worse at night, fatigue, denies GI sx, body aches.       Assessment and Plan   1. Acute cough  Negative for flu and COVID  - POCT Influenza A/B Antigen  - POCT COVID-19, Antigen    2. Asthma with acute exacerbation, unspecified asthma severity, unspecified whether persistent  Will offer Pred burst with instructions to wean if he responds after 3 days.  Will see him back if fevers chills develop or if symptoms worsen.  - predniSONE (DELTASONE) 20 MG tablet; Take 1 tablet by mouth 2 times daily for 7 days  Dispense: 14 tablet; Refill: 0      Return to Office: Return if symptoms worsen or fail to improve.    History ofPresent Illness   The patient is a 59 y.o. male  presented to the clinic with complaints as above.    Wheezing and sickness - 7d illness with wheezing and cough.  Has used bromfed which helped.  No fevers or chills suggest progressive illness.    Review of Systems:   Review of Systems   Respiratory:  Positive for cough, shortness of breath and wheezing. Negative for choking, chest tightness and stridor.    Cardiovascular:  Negative for chest pain and palpitations.   Gastrointestinal:  Negative for abdominal pain.       Physical Exam   Vitals: /80   Pulse 71   Temp 98.6 °F (37 °C)   Resp 18   Ht 1.727 m (5' 8\")   Wt 93 kg (205 lb)   SpO2 98%   BMI 31.17 kg/m²   BP Readings from Last 3 Encounters:   03/07/25 137/80   01/23/25 124/67   09/12/24 132/78     Physical Exam  Constitutional:       Appearance: He is well-developed.   Cardiovascular:      Rate and Rhythm: Normal rate.      Heart sounds: Normal heart sounds. No murmur heard.     No friction rub. No gallop.   Pulmonary:      Effort:

## 2025-03-10 ENCOUNTER — PATIENT MESSAGE (OUTPATIENT)
Dept: FAMILY MEDICINE CLINIC | Age: 60
End: 2025-03-10

## 2025-03-10 ENCOUNTER — TELEPHONE (OUTPATIENT)
Dept: FAMILY MEDICINE CLINIC | Age: 60
End: 2025-03-10

## 2025-03-10 NOTE — TELEPHONE ENCOUNTER
kSy was to call in if he was not feeling any better, same, no change, spoke with Dr Kern Bill is to continue the medication, call us back towards the end of the week if he is still not any better or if he gets any worse, Patient verbalized and understood.

## 2025-03-11 ASSESSMENT — ENCOUNTER SYMPTOMS
WHEEZING: 1
ABDOMINAL PAIN: 0
CHOKING: 0
COUGH: 1
CHEST TIGHTNESS: 0
STRIDOR: 0
SHORTNESS OF BREATH: 1

## 2025-03-14 ENCOUNTER — OFFICE VISIT (OUTPATIENT)
Dept: FAMILY MEDICINE CLINIC | Age: 60
End: 2025-03-14
Payer: COMMERCIAL

## 2025-03-14 VITALS
HEART RATE: 82 BPM | WEIGHT: 208 LBS | TEMPERATURE: 97.8 F | DIASTOLIC BLOOD PRESSURE: 87 MMHG | OXYGEN SATURATION: 98 % | BODY MASS INDEX: 31.52 KG/M2 | HEIGHT: 68 IN | SYSTOLIC BLOOD PRESSURE: 137 MMHG | RESPIRATION RATE: 18 BRPM

## 2025-03-14 DIAGNOSIS — J01.90 ACUTE SINUSITIS, RECURRENCE NOT SPECIFIED, UNSPECIFIED LOCATION: ICD-10-CM

## 2025-03-14 DIAGNOSIS — R05.9 COUGH, UNSPECIFIED TYPE: Primary | ICD-10-CM

## 2025-03-14 PROCEDURE — 99213 OFFICE O/P EST LOW 20 MIN: CPT | Performed by: FAMILY MEDICINE

## 2025-03-14 RX ORDER — DOXYCYCLINE HYCLATE 100 MG
100 TABLET ORAL 2 TIMES DAILY
Qty: 14 TABLET | Refills: 0 | Status: SHIPPED | OUTPATIENT
Start: 2025-03-14 | End: 2025-03-21

## 2025-03-14 ASSESSMENT — ENCOUNTER SYMPTOMS
COUGH: 1
SHORTNESS OF BREATH: 0
ABDOMINAL PAIN: 0
RHINORRHEA: 0

## 2025-03-14 NOTE — PATIENT INSTRUCTIONS
IF NOT SHOWING ANY IMPROVEMENT BY MONDAY THEN START FAMOTIDINE (ZANTAC).      IF STILL NOT IMPROVING THEN BY WEDNESDAY GET CXR

## 2025-03-14 NOTE — PROGRESS NOTES
[facility]  Department of Family Medicine  Family Medicine Residency Program      Patient:  Dougie Arias 59 y.o. male  Date of Service: 3/14/25      Chief complaint:   Chief Complaint   Patient presents with    Cough     Cough persists, mostly dry occasionally productive. Prednisone complete taking bromfed with limited effect.       Assessment and Plan   1. Cough, unspecified type  Cough continue his ongoing treatment for sinusitis as I think this is most likely culprit however for persist into next week ongoing without any relief at all it may be time for a chest x-ray.  - XR CHEST STANDARD (2 VW); Future  - doxycycline hyclate (VIBRA-TABS) 100 MG tablet; Take 1 tablet by mouth 2 times daily for 7 days  Dispense: 14 tablet; Refill: 0    2. Acute sinusitis, recurrence not specified, unspecified location  Will treat for sinusitis that is most likely culprit of the stage.  - doxycycline hyclate (VIBRA-TABS) 100 MG tablet; Take 1 tablet by mouth 2 times daily for 7 days  Dispense: 14 tablet; Refill: 0      Return to Office: Return if symptoms worsen or fail to improve.    History ofPresent Illness   The patient is a 59 y.o. male  presented to the clinic with complaints as above.    cough - treated A/E asthma about a week ago and took pred burst however cough continues.  No report of F/C or increased sputum.  Does have some sputum.  Doesn't feel like he can clear this.  Nasal sx include some congestion/bleeding but not substantial. No otalgia.         Review of Systems:   Review of Systems   HENT:  Positive for congestion and postnasal drip. Negative for rhinorrhea.    Respiratory:  Positive for cough. Negative for shortness of breath.    Cardiovascular:  Negative for chest pain.   Gastrointestinal:  Negative for abdominal pain.       Physical Exam   Vitals: /87   Pulse 82   Temp 97.8 °F (36.6 °C)   Resp 18   Ht 1.727 m (5' 8\")   Wt 94.3 kg (208 lb)   SpO2 98%   BMI 31.63 kg/m²

## 2025-03-25 ENCOUNTER — OFFICE VISIT (OUTPATIENT)
Dept: FAMILY MEDICINE CLINIC | Age: 60
End: 2025-03-25
Payer: COMMERCIAL

## 2025-03-25 VITALS
HEIGHT: 68 IN | OXYGEN SATURATION: 96 % | RESPIRATION RATE: 16 BRPM | DIASTOLIC BLOOD PRESSURE: 62 MMHG | SYSTOLIC BLOOD PRESSURE: 139 MMHG | WEIGHT: 206 LBS | BODY MASS INDEX: 31.22 KG/M2 | TEMPERATURE: 97.4 F | HEART RATE: 70 BPM

## 2025-03-25 DIAGNOSIS — S05.8X2A ABRASION OF LEFT EYE, INITIAL ENCOUNTER: Primary | ICD-10-CM

## 2025-03-25 PROCEDURE — 99213 OFFICE O/P EST LOW 20 MIN: CPT

## 2025-03-25 RX ORDER — POLYMYXIN B SULFATE AND TRIMETHOPRIM 1; 10000 MG/ML; [USP'U]/ML
1 SOLUTION OPHTHALMIC EVERY 4 HOURS
Qty: 3 ML | Refills: 0 | Status: SHIPPED | OUTPATIENT
Start: 2025-03-25 | End: 2025-04-04

## 2025-03-25 ASSESSMENT — ENCOUNTER SYMPTOMS
EYE REDNESS: 1
EYE PAIN: 1
DIARRHEA: 0
VOMITING: 0
SHORTNESS OF BREATH: 0
CHEST TIGHTNESS: 0
EYE DISCHARGE: 1
WHEEZING: 0
NAUSEA: 0

## 2025-03-25 NOTE — PROGRESS NOTES
[facility]  Precepting Note    Subjective:  Left eye pain   A stick scratched his eye while pruning/gardening.   More painful on Monday (yday) and red.   Scheduled appt for today.   He Tx with visine eye drops that he day. Didn't help.   Noted mild green secretion.       ROS otherwise negative     Past medical, surgical, family and social history were reviewed, non-contributory, and unchanged unless otherwise stated.    Objective:    /62   Pulse 70   Temp 97.4 °F (36.3 °C) (Temporal)   Resp 16   Ht 1.727 m (5' 7.99\")   Wt 93.4 kg (206 lb)   SpO2 96%   BMI 31.33 kg/m²     Exam is as noted by resident with the following changes, additions or corrections:    General:  NAD; alert & oriented x 3   Heart:  RRR, no murmurs, gallops, or rubs.  Lungs:  CTA bilaterally, no wheeze, rales or rhonchi  Abd: bowel sounds present, nontender, nondistended, no masses  Extrem:  No clubbing, cyanosis, or edema  Left eye red without evident object, EOMI wo inc pain. Lids ok. RR ok.     Assessment/Plan:  Corneal abrasion, probably about 3mm, superficial  Polytrim drops  Back for any increase pain or irritation or change in vision.   Should heal quickly over a few days. FU if persisting into next week. Or worsening sooner.      Attending Physician Statement  I have reviewed the chart, including any radiology or labs. I have discussed the case, including pertinent history and exam findings with the resident.  I agree with the assessment, plan and orders as documented by the resident.  Please refer to the resident note for additional information.      Electronically signed by MEL GAXIOLA MD on 3/25/2025 at 11:55 AM  
Discharge (red conjunctiva, no evidence of abrasion or active hemorrhage.  No evidence presence of foreign body) present.     Pupils: Pupils are equal, round, and reactive to light.   Cardiovascular:      Rate and Rhythm: Normal rate and regular rhythm.      Pulses: Normal pulses.      Heart sounds: Normal heart sounds.   Pulmonary:      Effort: Pulmonary effort is normal.      Breath sounds: Normal breath sounds.   Abdominal:      General: Abdomen is flat. Bowel sounds are normal.      Palpations: Abdomen is soft.   Musculoskeletal:      Cervical back: Normal range of motion.      Right lower leg: No edema.      Left lower leg: No edema.   Skin:     Capillary Refill: Capillary refill takes less than 2 seconds.      Findings: No lesion or rash.   Neurological:      General: No focal deficit present.      Mental Status: He is alert and oriented to person, place, and time.   Psychiatric:         Mood and Affect: Mood normal.             Assessment and Plan       1. Abrasion of left eye, initial encounter  - Fluorescent dye, like fluorescein, was used to detect corneal abrasions by staining the damaged area, which then appears a small green abrasion around 0.3 cm under a special blue light on the inferior area.  -Start trial of trimethoprim-polymyxin b (POLYTRIM) 21710-9.1 UNIT/ML-% ophthalmic solution; Place 1 drop into the left eye every 4 hours for 10 days  Dispense: 3 mL; Refill: 0  -Avoid rubbing or touching constantly left eye.  Follow-up if symptoms fail to improve or worsen or start to develop any other systemic symptoms.  -Plan explained to the patient  -Patient understood and agreed with the plan      Counseled regarding above diagnosis, including possible risks and complications,  especially if left uncontrolled. Counseled regarding the possible side effects, risks, benefits and alternatives to treatment;patient and/or guardian verbalizes understanding, agrees, feels comfortable with and wishes to proceed

## 2025-07-16 DIAGNOSIS — J45.909 UNCOMPLICATED ASTHMA, UNSPECIFIED ASTHMA SEVERITY, UNSPECIFIED WHETHER PERSISTENT: Chronic | ICD-10-CM

## 2025-07-16 RX ORDER — BUDESONIDE AND FORMOTEROL FUMARATE DIHYDRATE 80; 4.5 UG/1; UG/1
AEROSOL RESPIRATORY (INHALATION)
Qty: 30.6 G | Refills: 3 | Status: SHIPPED | OUTPATIENT
Start: 2025-07-16

## 2025-07-16 NOTE — TELEPHONE ENCOUNTER
Name of Medication(s) Requested:  Requested Prescriptions     Pending Prescriptions Disp Refills    budesonide-formoterol (SYMBICORT) 80-4.5 MCG/ACT AERO [Pharmacy Med Name: Budesonide-Formoterol Fumarate 80-4.5 MCG/ACT Inhalation Aerosol] 30.6 g 3     Sig: USE 2 INHALATIONS BY MOUTH TWICE DAILY (EQUIVALENT TO BREYNA)       Medication is on current medication list Yes    Dosage and directions were verified? Yes    Quantity verified: 90 day supply     Pharmacy Verified?  Yes    Last Appointment:  3/14/2025    Future appts:  Future Appointments   Date Time Provider Department Center   8/22/2025  8:45 AM Trey Kern MD [facility]        (If no appt send self scheduling link. .[email or web address])  Scheduling request sent?     [] Yes  [x] No    Does patient need updated?  [] Yes  [x] No

## 2025-08-22 ENCOUNTER — OFFICE VISIT (OUTPATIENT)
Dept: FAMILY MEDICINE CLINIC | Age: 60
End: 2025-08-22
Payer: COMMERCIAL

## 2025-08-22 VITALS
TEMPERATURE: 98.1 F | BODY MASS INDEX: 30.46 KG/M2 | OXYGEN SATURATION: 99 % | HEIGHT: 68 IN | HEART RATE: 63 BPM | RESPIRATION RATE: 18 BRPM | SYSTOLIC BLOOD PRESSURE: 126 MMHG | DIASTOLIC BLOOD PRESSURE: 74 MMHG | WEIGHT: 201 LBS

## 2025-08-22 DIAGNOSIS — J45.909 UNCOMPLICATED ASTHMA, UNSPECIFIED ASTHMA SEVERITY, UNSPECIFIED WHETHER PERSISTENT: Primary | Chronic | ICD-10-CM

## 2025-08-22 PROCEDURE — 99213 OFFICE O/P EST LOW 20 MIN: CPT | Performed by: FAMILY MEDICINE

## 2025-08-22 RX ORDER — PREDNISONE 20 MG/1
20 TABLET ORAL DAILY
Qty: 10 TABLET | Refills: 0 | Status: SHIPPED | OUTPATIENT
Start: 2025-08-22 | End: 2025-09-01

## 2025-08-22 RX ORDER — BUDESONIDE AND FORMOTEROL FUMARATE DIHYDRATE 160; 4.5 UG/1; UG/1
2 AEROSOL RESPIRATORY (INHALATION) 2 TIMES DAILY
Qty: 3 EACH | Refills: 3 | Status: SHIPPED | OUTPATIENT
Start: 2025-08-22